# Patient Record
Sex: FEMALE | Race: ASIAN
[De-identification: names, ages, dates, MRNs, and addresses within clinical notes are randomized per-mention and may not be internally consistent; named-entity substitution may affect disease eponyms.]

---

## 2017-03-29 ENCOUNTER — HOSPITAL ENCOUNTER (OUTPATIENT)
Dept: HOSPITAL 8 - CACL | Age: 62
Discharge: HOME | End: 2017-03-29
Attending: INTERNAL MEDICINE
Payer: COMMERCIAL

## 2017-03-29 VITALS — SYSTOLIC BLOOD PRESSURE: 151 MMHG | DIASTOLIC BLOOD PRESSURE: 91 MMHG

## 2017-03-29 VITALS — HEIGHT: 65 IN | BODY MASS INDEX: 31.55 KG/M2 | WEIGHT: 189.38 LBS

## 2017-03-29 DIAGNOSIS — E03.9: ICD-10-CM

## 2017-03-29 DIAGNOSIS — I34.0: ICD-10-CM

## 2017-03-29 DIAGNOSIS — E78.5: ICD-10-CM

## 2017-03-29 DIAGNOSIS — I25.10: ICD-10-CM

## 2017-03-29 DIAGNOSIS — I48.91: Primary | ICD-10-CM

## 2017-03-29 DIAGNOSIS — Z95.5: ICD-10-CM

## 2017-03-29 DIAGNOSIS — I10: ICD-10-CM

## 2017-03-29 LAB
BUN SERPL-MCNC: 32 MG/DL (ref 7–18)
HGB BLD-MCNC: 13.1 G/DL (ref 11.7–16.4)

## 2017-03-29 PROCEDURE — 85610 PROTHROMBIN TIME: CPT

## 2017-03-29 PROCEDURE — 80048 BASIC METABOLIC PNL TOTAL CA: CPT

## 2017-03-29 PROCEDURE — 92960 CARDIOVERSION ELECTRIC EXT: CPT

## 2017-03-29 PROCEDURE — 36415 COLL VENOUS BLD VENIPUNCTURE: CPT

## 2017-03-29 PROCEDURE — 85025 COMPLETE CBC W/AUTO DIFF WBC: CPT

## 2017-04-18 ENCOUNTER — HOSPITAL ENCOUNTER (OUTPATIENT)
Dept: HOSPITAL 8 - CFH | Age: 62
Discharge: HOME | End: 2017-04-18
Attending: UROLOGY
Payer: COMMERCIAL

## 2017-04-18 DIAGNOSIS — Z95.2: ICD-10-CM

## 2017-04-18 DIAGNOSIS — K57.30: Primary | ICD-10-CM

## 2017-04-18 DIAGNOSIS — K76.0: ICD-10-CM

## 2017-04-18 PROCEDURE — 74178 CT ABD&PLV WO CNTR FLWD CNTR: CPT

## 2017-11-16 ENCOUNTER — HOSPITAL ENCOUNTER (OUTPATIENT)
Dept: HOSPITAL 8 - CFH | Age: 62
Discharge: HOME | End: 2017-11-16
Attending: FAMILY MEDICINE
Payer: COMMERCIAL

## 2017-11-16 DIAGNOSIS — Z12.31: Primary | ICD-10-CM

## 2017-11-16 PROCEDURE — G0202 SCR MAMMO BI INCL CAD: HCPCS

## 2018-04-27 VITALS — SYSTOLIC BLOOD PRESSURE: 153 MMHG | DIASTOLIC BLOOD PRESSURE: 104 MMHG

## 2018-04-27 LAB
ANION GAP SERPL CALC-SCNC: 8 MMOL/L (ref 5–15)
BASOPHILS # BLD AUTO: 0.05 X10^3/UL (ref 0–0.1)
BASOPHILS NFR BLD AUTO: 1 % (ref 0–1)
CALCIUM SERPL-MCNC: 9.2 MG/DL (ref 8.5–10.1)
CHLORIDE SERPL-SCNC: 109 MMOL/L (ref 98–107)
CREAT SERPL-MCNC: 1.04 MG/DL (ref 0.55–1.02)
EOSINOPHIL # BLD AUTO: 0.1 X10^3/UL (ref 0–0.4)
EOSINOPHIL NFR BLD AUTO: 2 % (ref 1–7)
ERYTHROCYTE [DISTWIDTH] IN BLOOD BY AUTOMATED COUNT: 14.2 % (ref 9.6–15.2)
LYMPHOCYTES # BLD AUTO: 2.41 X10^3/UL (ref 1–3.4)
LYMPHOCYTES NFR BLD AUTO: 38 % (ref 22–44)
MCH RBC QN AUTO: 28.9 PG (ref 27–34.8)
MCHC RBC AUTO-ENTMCNC: 33.4 G/DL (ref 32.4–35.8)
MCV RBC AUTO: 86.7 FL (ref 80–100)
MD: NO
MONOCYTES # BLD AUTO: 0.51 X10^3/UL (ref 0.2–0.8)
MONOCYTES NFR BLD AUTO: 8 % (ref 2–9)
NEUTROPHILS # BLD AUTO: 3.23 X10^3/UL (ref 1.8–6.8)
NEUTROPHILS NFR BLD AUTO: 51 % (ref 42–75)
PLATELET # BLD AUTO: 198 X10^3/UL (ref 130–400)
PMV BLD AUTO: 9.1 FL (ref 7.4–10.4)
RBC # BLD AUTO: 5.51 X10^6/UL (ref 3.82–5.3)

## 2018-04-30 ENCOUNTER — HOSPITAL ENCOUNTER (INPATIENT)
Dept: HOSPITAL 8 - CACL | Age: 63
LOS: 3 days | Discharge: HOME | DRG: 243 | End: 2018-05-03
Attending: INTERNAL MEDICINE | Admitting: INTERNAL MEDICINE
Payer: MEDICAID

## 2018-04-30 VITALS — DIASTOLIC BLOOD PRESSURE: 71 MMHG | SYSTOLIC BLOOD PRESSURE: 122 MMHG

## 2018-04-30 VITALS — HEIGHT: 65 IN | WEIGHT: 202.83 LBS | BODY MASS INDEX: 33.79 KG/M2

## 2018-04-30 VITALS — DIASTOLIC BLOOD PRESSURE: 62 MMHG | SYSTOLIC BLOOD PRESSURE: 108 MMHG

## 2018-04-30 DIAGNOSIS — E03.9: ICD-10-CM

## 2018-04-30 DIAGNOSIS — I48.92: Primary | ICD-10-CM

## 2018-04-30 DIAGNOSIS — D68.69: ICD-10-CM

## 2018-04-30 DIAGNOSIS — E11.9: ICD-10-CM

## 2018-04-30 DIAGNOSIS — Z87.891: ICD-10-CM

## 2018-04-30 DIAGNOSIS — I10: ICD-10-CM

## 2018-04-30 DIAGNOSIS — E66.9: ICD-10-CM

## 2018-04-30 DIAGNOSIS — Z95.2: ICD-10-CM

## 2018-04-30 DIAGNOSIS — E78.5: ICD-10-CM

## 2018-04-30 PROCEDURE — 85025 COMPLETE CBC W/AUTO DIFF WBC: CPT

## 2018-04-30 PROCEDURE — 93613 INTRACARDIAC EPHYS 3D MAPG: CPT

## 2018-04-30 PROCEDURE — 02K83ZZ MAP CONDUCTION MECHANISM, PERCUTANEOUS APPROACH: ICD-10-PCS | Performed by: INTERNAL MEDICINE

## 2018-04-30 PROCEDURE — C2630 CATH, EP, COOL-TIP: HCPCS

## 2018-04-30 PROCEDURE — 36415 COLL VENOUS BLD VENIPUNCTURE: CPT

## 2018-04-30 PROCEDURE — C1893 INTRO/SHEATH, FIXED,NON-PEEL: HCPCS

## 2018-04-30 PROCEDURE — C1759 CATH, INTRA ECHOCARDIOGRAPHY: HCPCS

## 2018-04-30 PROCEDURE — C1731 CATH, EP, 20 OR MORE ELEC: HCPCS

## 2018-04-30 PROCEDURE — C1785 PMKR, DUAL, RATE-RESP: HCPCS

## 2018-04-30 PROCEDURE — 93621 COMP EP EVL L PAC&REC C SINS: CPT

## 2018-04-30 PROCEDURE — G0378 HOSPITAL OBSERVATION PER HR: HCPCS

## 2018-04-30 PROCEDURE — 71045 X-RAY EXAM CHEST 1 VIEW: CPT

## 2018-04-30 PROCEDURE — 93462 L HRT CATH TRNSPTL PUNCTURE: CPT

## 2018-04-30 PROCEDURE — 02583ZZ DESTRUCTION OF CONDUCTION MECHANISM, PERCUTANEOUS APPROACH: ICD-10-PCS | Performed by: INTERNAL MEDICINE

## 2018-04-30 PROCEDURE — 33208 INSRT HEART PM ATRIAL & VENT: CPT

## 2018-04-30 PROCEDURE — 85347 COAGULATION TIME ACTIVATED: CPT

## 2018-04-30 PROCEDURE — 93312 ECHO TRANSESOPHAGEAL: CPT

## 2018-04-30 PROCEDURE — 71046 X-RAY EXAM CHEST 2 VIEWS: CPT

## 2018-04-30 PROCEDURE — C1779 LEAD, PMKR, TRANSVENOUS VDD: HCPCS

## 2018-04-30 PROCEDURE — 80048 BASIC METABOLIC PNL TOTAL CA: CPT

## 2018-04-30 PROCEDURE — C1730 CATH, EP, 19 OR FEW ELECT: HCPCS

## 2018-04-30 PROCEDURE — C1766 INTRO/SHEATH,STRBLE,NON-PEEL: HCPCS

## 2018-04-30 PROCEDURE — C1732 CATH, EP, DIAG/ABL, 3D/VECT: HCPCS

## 2018-04-30 PROCEDURE — C1894 INTRO/SHEATH, NON-LASER: HCPCS

## 2018-04-30 PROCEDURE — 85520 HEPARIN ASSAY: CPT

## 2018-04-30 PROCEDURE — 93005 ELECTROCARDIOGRAM TRACING: CPT

## 2018-04-30 PROCEDURE — C1892 INTRO/SHEATH,FIXED,PEEL-AWAY: HCPCS

## 2018-04-30 PROCEDURE — 93306 TTE W/DOPPLER COMPLETE: CPT

## 2018-04-30 PROCEDURE — 93662 INTRACARDIAC ECG (ICE): CPT

## 2018-04-30 PROCEDURE — 93653 COMPRE EP EVAL TX SVT: CPT

## 2018-04-30 PROCEDURE — 93325 DOPPLER ECHO COLOR FLOW MAPG: CPT

## 2018-04-30 RX ADMIN — ATORVASTATIN CALCIUM SCH MG: 10 TABLET, FILM COATED ORAL at 20:05

## 2018-04-30 RX ADMIN — SOTALOL HYDROCHLORIDE SCH MG: 80 TABLET ORAL at 18:43

## 2018-05-01 VITALS — SYSTOLIC BLOOD PRESSURE: 116 MMHG | DIASTOLIC BLOOD PRESSURE: 73 MMHG

## 2018-05-01 VITALS — DIASTOLIC BLOOD PRESSURE: 75 MMHG | SYSTOLIC BLOOD PRESSURE: 118 MMHG

## 2018-05-01 VITALS — DIASTOLIC BLOOD PRESSURE: 69 MMHG | SYSTOLIC BLOOD PRESSURE: 106 MMHG

## 2018-05-01 VITALS — SYSTOLIC BLOOD PRESSURE: 122 MMHG | DIASTOLIC BLOOD PRESSURE: 83 MMHG

## 2018-05-01 PROCEDURE — 02HK3JZ INSERTION OF PACEMAKER LEAD INTO RIGHT VENTRICLE, PERCUTANEOUS APPROACH: ICD-10-PCS | Performed by: INTERNAL MEDICINE

## 2018-05-01 PROCEDURE — 4A023FZ MEASUREMENT OF CARDIAC RHYTHM, PERCUTANEOUS APPROACH: ICD-10-PCS | Performed by: INTERNAL MEDICINE

## 2018-05-01 PROCEDURE — 0JH606Z INSERTION OF PACEMAKER, DUAL CHAMBER INTO CHEST SUBCUTANEOUS TISSUE AND FASCIA, OPEN APPROACH: ICD-10-PCS | Performed by: INTERNAL MEDICINE

## 2018-05-01 PROCEDURE — 02H63JZ INSERTION OF PACEMAKER LEAD INTO RIGHT ATRIUM, PERCUTANEOUS APPROACH: ICD-10-PCS | Performed by: INTERNAL MEDICINE

## 2018-05-01 PROCEDURE — 4A0234Z MEASUREMENT OF CARDIAC ELECTRICAL ACTIVITY, PERCUTANEOUS APPROACH: ICD-10-PCS | Performed by: INTERNAL MEDICINE

## 2018-05-01 PROCEDURE — B246ZZ4 ULTRASONOGRAPHY OF RIGHT AND LEFT HEART, TRANSESOPHAGEAL: ICD-10-PCS | Performed by: INTERNAL MEDICINE

## 2018-05-01 RX ADMIN — APIXABAN SCH MG: 5 TABLET, FILM COATED ORAL at 19:50

## 2018-05-01 RX ADMIN — SOTALOL HYDROCHLORIDE SCH MG: 80 TABLET ORAL at 17:38

## 2018-05-01 RX ADMIN — PIOGLITAZONE HYDROCHLORIDE SCH MG: 15 TABLET ORAL at 09:01

## 2018-05-01 RX ADMIN — FENOFIBRATE SCH MG: 145 TABLET, FILM COATED ORAL at 09:01

## 2018-05-01 RX ADMIN — LOSARTAN POTASSIUM SCH MG: 25 TABLET, FILM COATED ORAL at 09:01

## 2018-05-01 RX ADMIN — ATORVASTATIN CALCIUM SCH MG: 10 TABLET, FILM COATED ORAL at 19:50

## 2018-05-01 RX ADMIN — SOTALOL HYDROCHLORIDE SCH MG: 80 TABLET ORAL at 05:54

## 2018-05-01 RX ADMIN — APIXABAN SCH MG: 5 TABLET, FILM COATED ORAL at 09:01

## 2018-05-01 RX ADMIN — Medication SCH MG: at 09:01

## 2018-05-02 VITALS — DIASTOLIC BLOOD PRESSURE: 78 MMHG | SYSTOLIC BLOOD PRESSURE: 117 MMHG

## 2018-05-02 VITALS — SYSTOLIC BLOOD PRESSURE: 106 MMHG | DIASTOLIC BLOOD PRESSURE: 72 MMHG

## 2018-05-02 VITALS — SYSTOLIC BLOOD PRESSURE: 119 MMHG | DIASTOLIC BLOOD PRESSURE: 76 MMHG

## 2018-05-02 VITALS — SYSTOLIC BLOOD PRESSURE: 133 MMHG | DIASTOLIC BLOOD PRESSURE: 86 MMHG

## 2018-05-02 RX ADMIN — SOTALOL HYDROCHLORIDE SCH MG: 80 TABLET ORAL at 06:16

## 2018-05-02 RX ADMIN — APIXABAN SCH MG: 5 TABLET, FILM COATED ORAL at 19:52

## 2018-05-02 RX ADMIN — ATORVASTATIN CALCIUM SCH MG: 10 TABLET, FILM COATED ORAL at 19:52

## 2018-05-02 RX ADMIN — APIXABAN SCH MG: 5 TABLET, FILM COATED ORAL at 09:58

## 2018-05-02 RX ADMIN — FENOFIBRATE SCH MG: 145 TABLET, FILM COATED ORAL at 09:57

## 2018-05-02 RX ADMIN — LOSARTAN POTASSIUM SCH MG: 25 TABLET, FILM COATED ORAL at 09:58

## 2018-05-02 RX ADMIN — Medication SCH MG: at 09:57

## 2018-05-02 RX ADMIN — SOTALOL HYDROCHLORIDE SCH MG: 80 TABLET ORAL at 18:22

## 2018-05-02 RX ADMIN — PIOGLITAZONE HYDROCHLORIDE SCH MG: 15 TABLET ORAL at 09:57

## 2018-05-02 RX ADMIN — LEVOTHYROXINE SODIUM SCH MCG: 75 TABLET ORAL at 09:58

## 2018-05-03 VITALS — DIASTOLIC BLOOD PRESSURE: 88 MMHG | SYSTOLIC BLOOD PRESSURE: 139 MMHG

## 2018-05-03 VITALS — SYSTOLIC BLOOD PRESSURE: 143 MMHG | DIASTOLIC BLOOD PRESSURE: 84 MMHG

## 2018-05-03 VITALS — SYSTOLIC BLOOD PRESSURE: 157 MMHG | DIASTOLIC BLOOD PRESSURE: 92 MMHG

## 2018-05-03 VITALS — DIASTOLIC BLOOD PRESSURE: 86 MMHG | SYSTOLIC BLOOD PRESSURE: 142 MMHG

## 2018-05-03 RX ADMIN — LEVOTHYROXINE SODIUM SCH MCG: 75 TABLET ORAL at 05:43

## 2018-05-03 RX ADMIN — Medication SCH MG: at 08:22

## 2018-05-03 RX ADMIN — PIOGLITAZONE HYDROCHLORIDE SCH MG: 15 TABLET ORAL at 08:22

## 2018-05-03 RX ADMIN — CEPHALEXIN SCH MG: 500 CAPSULE ORAL at 13:54

## 2018-05-03 RX ADMIN — SOTALOL HYDROCHLORIDE SCH MG: 80 TABLET ORAL at 05:43

## 2018-05-03 RX ADMIN — LOSARTAN POTASSIUM SCH MG: 25 TABLET, FILM COATED ORAL at 08:23

## 2018-05-03 RX ADMIN — SOTALOL HYDROCHLORIDE SCH MG: 80 TABLET ORAL at 16:33

## 2018-05-03 RX ADMIN — APIXABAN SCH MG: 5 TABLET, FILM COATED ORAL at 08:22

## 2018-05-03 RX ADMIN — FENOFIBRATE SCH MG: 145 TABLET, FILM COATED ORAL at 08:22

## 2018-05-03 RX ADMIN — CEPHALEXIN SCH MG: 500 CAPSULE ORAL at 16:32

## 2018-09-28 ENCOUNTER — HOSPITAL ENCOUNTER (OUTPATIENT)
Dept: HOSPITAL 8 - CACL | Age: 63
Discharge: HOME | End: 2018-09-28
Attending: INTERNAL MEDICINE
Payer: COMMERCIAL

## 2018-09-28 VITALS — WEIGHT: 220.46 LBS | HEIGHT: 65 IN | BODY MASS INDEX: 36.73 KG/M2

## 2018-09-28 VITALS — DIASTOLIC BLOOD PRESSURE: 99 MMHG | SYSTOLIC BLOOD PRESSURE: 140 MMHG

## 2018-09-28 DIAGNOSIS — I25.119: ICD-10-CM

## 2018-09-28 DIAGNOSIS — E78.5: ICD-10-CM

## 2018-09-28 DIAGNOSIS — I48.92: ICD-10-CM

## 2018-09-28 DIAGNOSIS — I48.91: Primary | ICD-10-CM

## 2018-09-28 DIAGNOSIS — Z95.2: ICD-10-CM

## 2018-09-28 DIAGNOSIS — I10: ICD-10-CM

## 2018-09-28 DIAGNOSIS — E03.9: ICD-10-CM

## 2018-09-28 DIAGNOSIS — Z98.61: ICD-10-CM

## 2018-09-28 LAB
ANION GAP SERPL CALC-SCNC: 9 MMOL/L (ref 5–15)
BASOPHILS # BLD AUTO: 0.05 X10^3/UL (ref 0–0.1)
BASOPHILS NFR BLD AUTO: 1 % (ref 0–1)
CALCIUM SERPL-MCNC: 9.3 MG/DL (ref 8.5–10.1)
CHLORIDE SERPL-SCNC: 110 MMOL/L (ref 98–107)
CREAT SERPL-MCNC: 1.03 MG/DL (ref 0.55–1.02)
EOSINOPHIL # BLD AUTO: 0.08 X10^3/UL (ref 0–0.4)
EOSINOPHIL NFR BLD AUTO: 1 % (ref 1–7)
ERYTHROCYTE [DISTWIDTH] IN BLOOD BY AUTOMATED COUNT: 14.6 % (ref 9.6–15.2)
LYMPHOCYTES # BLD AUTO: 2.21 X10^3/UL (ref 1–3.4)
LYMPHOCYTES NFR BLD AUTO: 35 % (ref 22–44)
MCH RBC QN AUTO: 29.9 PG (ref 27–34.8)
MCHC RBC AUTO-ENTMCNC: 33.6 G/DL (ref 32.4–35.8)
MCV RBC AUTO: 89 FL (ref 80–100)
MD: NO
MONOCYTES # BLD AUTO: 0.49 X10^3/UL (ref 0.2–0.8)
MONOCYTES NFR BLD AUTO: 8 % (ref 2–9)
NEUTROPHILS # BLD AUTO: 3.41 X10^3/UL (ref 1.8–6.8)
NEUTROPHILS NFR BLD AUTO: 55 % (ref 42–75)
PLATELET # BLD AUTO: 161 X10^3/UL (ref 130–400)
PMV BLD AUTO: 9.5 FL (ref 7.4–10.4)
RBC # BLD AUTO: 5.55 X10^6/UL (ref 3.82–5.3)

## 2018-09-28 PROCEDURE — 80048 BASIC METABOLIC PNL TOTAL CA: CPT

## 2018-09-28 PROCEDURE — 36415 COLL VENOUS BLD VENIPUNCTURE: CPT

## 2018-09-28 PROCEDURE — 85025 COMPLETE CBC W/AUTO DIFF WBC: CPT

## 2018-09-28 PROCEDURE — 93005 ELECTROCARDIOGRAM TRACING: CPT

## 2018-09-28 PROCEDURE — 92960 CARDIOVERSION ELECTRIC EXT: CPT

## 2019-03-12 ENCOUNTER — HOSPITAL ENCOUNTER (OUTPATIENT)
Dept: HOSPITAL 8 - CFH | Age: 64
Discharge: HOME | End: 2019-03-12
Attending: PHYSICIAN ASSISTANT
Payer: MEDICARE

## 2019-03-12 DIAGNOSIS — Z95.0: ICD-10-CM

## 2019-03-12 DIAGNOSIS — K57.30: Primary | ICD-10-CM

## 2019-03-12 DIAGNOSIS — I51.7: ICD-10-CM

## 2019-03-12 PROCEDURE — 74178 CT ABD&PLV WO CNTR FLWD CNTR: CPT

## 2019-07-08 ENCOUNTER — HOSPITAL ENCOUNTER (OUTPATIENT)
Dept: HOSPITAL 8 - CFH | Age: 64
Discharge: HOME | End: 2019-07-08
Attending: PHYSICIAN ASSISTANT
Payer: MEDICARE

## 2019-07-08 DIAGNOSIS — I35.8: Primary | ICD-10-CM

## 2019-07-08 DIAGNOSIS — I25.9: ICD-10-CM

## 2019-07-08 PROCEDURE — 93306 TTE W/DOPPLER COMPLETE: CPT

## 2021-03-03 DIAGNOSIS — Z23 NEED FOR VACCINATION: ICD-10-CM

## 2021-04-09 ENCOUNTER — HOSPITAL ENCOUNTER (EMERGENCY)
Dept: HOSPITAL 8 - ED | Age: 66
Discharge: HOME | End: 2021-04-09
Payer: MEDICARE

## 2021-04-09 VITALS — BODY MASS INDEX: 37.85 KG/M2 | WEIGHT: 213.63 LBS | HEIGHT: 63 IN

## 2021-04-09 DIAGNOSIS — R04.0: Primary | ICD-10-CM

## 2021-04-09 PROCEDURE — 99281 EMR DPT VST MAYX REQ PHY/QHP: CPT

## 2021-04-09 NOTE — NUR
PT. IS A & O X 4 WITH A GCS OF 15.  PT. WAS SENT FROM URGENT CARE FOR C/O 
EPISTAXIS.  PT. IS CURRENTLY WITHOUT A BLOODY NOSE.  PT. DENIES C/O PAIN.  S1 
S2 NOTED WITH A MURMUR NOTED.  CAP REFILL IS BRISK, LESS THAN 2 SECONDS.  SKIN 
IS PINK, WARM AND DRY.  PT.'S LUNGS ARE CTA THROUGHOUT.  PT.'S NECK IS MIDLINE 
WITHOUT JVD NOTED.  ABD. IS SOFT AND NON-TENDER WITH BS + X 4 QUADS.  PT. IS 
AMBULATORY TO THE CHAIR AND WAITING TO BE SEEN.  CALL LIGHT IS IN PLACE.

## 2021-04-09 NOTE — NUR
CARE FOR DC ONLY PROVIDED.  NO NOSE BLEEDING NOTED.  NO IV TO DC.  REVIEWED DC 
INSTRUCTIONS WITH PT.  UNDERSTANDING VERBALZIED.  PT LEFT AMB, GAIT STEADY.

## 2021-06-08 ENCOUNTER — HOSPITAL ENCOUNTER (OUTPATIENT)
Dept: HOSPITAL 8 - CVU | Age: 66
Discharge: HOME | End: 2021-06-08
Attending: INTERNAL MEDICINE
Payer: MEDICARE

## 2021-06-08 DIAGNOSIS — M79.89: ICD-10-CM

## 2021-06-08 DIAGNOSIS — Z95.2: ICD-10-CM

## 2021-06-08 DIAGNOSIS — I08.2: Primary | ICD-10-CM

## 2021-06-08 PROCEDURE — 93970 EXTREMITY STUDY: CPT

## 2021-06-08 PROCEDURE — 93306 TTE W/DOPPLER COMPLETE: CPT

## 2022-10-27 ENCOUNTER — APPOINTMENT (OUTPATIENT)
Dept: RADIOLOGY | Facility: MEDICAL CENTER | Age: 67
DRG: 023 | End: 2022-10-27
Attending: INTERNAL MEDICINE
Payer: MEDICARE

## 2022-10-27 ENCOUNTER — APPOINTMENT (OUTPATIENT)
Dept: RADIOLOGY | Facility: MEDICAL CENTER | Age: 67
DRG: 023 | End: 2022-10-27
Attending: EMERGENCY MEDICINE
Payer: MEDICARE

## 2022-10-27 ENCOUNTER — ANESTHESIA (OUTPATIENT)
Dept: SURGERY | Facility: MEDICAL CENTER | Age: 67
DRG: 023 | End: 2022-10-27
Payer: MEDICARE

## 2022-10-27 ENCOUNTER — HOSPITAL ENCOUNTER (INPATIENT)
Facility: MEDICAL CENTER | Age: 67
LOS: 1 days | DRG: 023 | End: 2022-10-28
Attending: EMERGENCY MEDICINE | Admitting: INTERNAL MEDICINE
Payer: MEDICARE

## 2022-10-27 ENCOUNTER — HOSPITAL ENCOUNTER (EMERGENCY)
Facility: MEDICAL CENTER | Age: 67
DRG: 023 | End: 2022-10-27
Attending: EMERGENCY MEDICINE
Payer: MEDICARE

## 2022-10-27 ENCOUNTER — APPOINTMENT (OUTPATIENT)
Dept: RADIOLOGY | Facility: MEDICAL CENTER | Age: 67
DRG: 023 | End: 2022-10-27
Attending: PSYCHIATRY & NEUROLOGY
Payer: MEDICARE

## 2022-10-27 ENCOUNTER — ANESTHESIA EVENT (OUTPATIENT)
Dept: SURGERY | Facility: MEDICAL CENTER | Age: 67
DRG: 023 | End: 2022-10-27
Payer: MEDICARE

## 2022-10-27 ENCOUNTER — APPOINTMENT (OUTPATIENT)
Dept: RADIOLOGY | Facility: MEDICAL CENTER | Age: 67
DRG: 023 | End: 2022-10-27
Attending: NURSE PRACTITIONER
Payer: MEDICARE

## 2022-10-27 ENCOUNTER — APPOINTMENT (OUTPATIENT)
Dept: RADIOLOGY | Facility: MEDICAL CENTER | Age: 67
DRG: 023 | End: 2022-10-27
Attending: RADIOLOGY
Payer: MEDICARE

## 2022-10-27 DIAGNOSIS — I63.89 CEREBROVASCULAR ACCIDENT (CVA) DUE TO OTHER MECHANISM (HCC): ICD-10-CM

## 2022-10-27 DIAGNOSIS — I63.9 ACUTE ISCHEMIC STROKE (HCC): ICD-10-CM

## 2022-10-27 PROBLEM — I25.10 CAD (CORONARY ARTERY DISEASE): Status: ACTIVE | Noted: 2022-10-27

## 2022-10-27 PROBLEM — I48.91 AF (ATRIAL FIBRILLATION) (HCC): Status: ACTIVE | Noted: 2022-10-27

## 2022-10-27 PROBLEM — I10 PRIMARY HYPERTENSION: Status: ACTIVE | Noted: 2022-10-27

## 2022-10-27 PROBLEM — E03.9 HYPOTHYROIDISM: Status: ACTIVE | Noted: 2022-10-27

## 2022-10-27 PROBLEM — Z95.2 S/P MVR (MITRAL VALVE REPLACEMENT): Status: ACTIVE | Noted: 2022-10-27

## 2022-10-27 PROBLEM — E78.5 DYSLIPIDEMIA: Status: ACTIVE | Noted: 2022-10-27

## 2022-10-27 LAB
ABO + RH BLD: NORMAL
ABO GROUP BLD: NORMAL
ABO GROUP BLD: NORMAL
ALBUMIN SERPL BCP-MCNC: 2.4 G/DL (ref 3.2–4.9)
ALBUMIN SERPL BCP-MCNC: NORMAL G/DL (ref 3.2–4.9)
ALBUMIN/GLOB SERPL: 0.5 G/DL
ALBUMIN/GLOB SERPL: NORMAL G/DL
ALP SERPL-CCNC: 87 U/L (ref 30–99)
ALP SERPL-CCNC: NORMAL U/L (ref 30–99)
ALT SERPL-CCNC: 37 U/L (ref 2–50)
ALT SERPL-CCNC: NORMAL U/L (ref 2–50)
ANION GAP SERPL CALC-SCNC: 11 MMOL/L (ref 7–16)
ANION GAP SERPL CALC-SCNC: 9 MMOL/L (ref 7–16)
ANION GAP SERPL CALC-SCNC: NORMAL MMOL/L (ref 7–16)
APTT PPP: 41.1 SEC (ref 24.7–36)
APTT PPP: NORMAL SEC (ref 24.7–36)
AST SERPL-CCNC: 48 U/L (ref 12–45)
AST SERPL-CCNC: NORMAL U/L (ref 12–45)
BARCODED ABORH UBTYP: 5100
BARCODED PRD CODE UBPRD: NORMAL
BARCODED UNIT NUM UBUNT: NORMAL
BASE EXCESS BLDA CALC-SCNC: -6 MMOL/L (ref -4–3)
BASOPHILS # BLD AUTO: 0.3 % (ref 0–1.8)
BASOPHILS # BLD AUTO: NORMAL % (ref 0–1.8)
BASOPHILS # BLD: 0.04 K/UL (ref 0–0.12)
BASOPHILS # BLD: NORMAL K/UL (ref 0–0.12)
BILIRUB SERPL-MCNC: 1.1 MG/DL (ref 0.1–1.5)
BILIRUB SERPL-MCNC: NORMAL MG/DL (ref 0.1–1.5)
BLD GP AB SCN SERPL QL: NORMAL
BLD GP AB SCN SERPL QL: NORMAL
BODY TEMPERATURE: ABNORMAL DEGREES
BUN SERPL-MCNC: 38 MG/DL (ref 8–22)
BUN SERPL-MCNC: 39 MG/DL (ref 8–22)
BUN SERPL-MCNC: NORMAL MG/DL (ref 8–22)
CALCIUM SERPL-MCNC: 8.3 MG/DL (ref 8.5–10.5)
CALCIUM SERPL-MCNC: 8.7 MG/DL (ref 8.5–10.5)
CALCIUM SERPL-MCNC: NORMAL MG/DL (ref 8.5–10.5)
CHLORIDE SERPL-SCNC: 105 MMOL/L (ref 96–112)
CHLORIDE SERPL-SCNC: 105 MMOL/L (ref 96–112)
CHLORIDE SERPL-SCNC: NORMAL MMOL/L (ref 96–112)
CO2 BLDA-SCNC: 22 MMOL/L (ref 20–33)
CO2 SERPL-SCNC: 17 MMOL/L (ref 20–33)
CO2 SERPL-SCNC: 18 MMOL/L (ref 20–33)
CO2 SERPL-SCNC: NORMAL MMOL/L (ref 20–33)
COMPONENT P 8504P: NORMAL
CREAT SERPL-MCNC: 1.89 MG/DL (ref 0.5–1.4)
CREAT SERPL-MCNC: 2.04 MG/DL (ref 0.5–1.4)
CREAT SERPL-MCNC: NORMAL MG/DL (ref 0.5–1.4)
EKG IMPRESSION: NORMAL
EKG IMPRESSION: NORMAL
EOSINOPHIL # BLD AUTO: 0.04 K/UL (ref 0–0.51)
EOSINOPHIL # BLD AUTO: NORMAL K/UL (ref 0–0.51)
EOSINOPHIL NFR BLD: 0.3 % (ref 0–6.9)
EOSINOPHIL NFR BLD: NORMAL % (ref 0–6.9)
ERYTHROCYTE [DISTWIDTH] IN BLOOD BY AUTOMATED COUNT: 48.6 FL (ref 35.9–50)
ERYTHROCYTE [DISTWIDTH] IN BLOOD BY AUTOMATED COUNT: NORMAL FL (ref 35.9–50)
GFR SERPLBLD CREATININE-BSD FMLA CKD-EPI: 26 ML/MIN/1.73 M 2
GFR SERPLBLD CREATININE-BSD FMLA CKD-EPI: 28 ML/MIN/1.73 M 2
GFR SERPLBLD CREATININE-BSD FMLA CKD-EPI: NORMAL ML/MIN/1.73 M 2
GLOBULIN SER CALC-MCNC: 4.6 G/DL (ref 1.9–3.5)
GLOBULIN SER CALC-MCNC: NORMAL G/DL (ref 1.9–3.5)
GLUCOSE SERPL-MCNC: 182 MG/DL (ref 65–99)
GLUCOSE SERPL-MCNC: 99 MG/DL (ref 65–99)
GLUCOSE SERPL-MCNC: NORMAL MG/DL (ref 65–99)
HCO3 BLDA-SCNC: 20.9 MMOL/L (ref 17–25)
HCT VFR BLD AUTO: 32.9 % (ref 37–47)
HCT VFR BLD AUTO: NORMAL % (ref 37–47)
HGB BLD-MCNC: 10.7 G/DL (ref 12–16)
HGB BLD-MCNC: NORMAL G/DL (ref 12–16)
HOROWITZ INDEX BLDA+IHG-RTO: 234 MM[HG]
IMM GRANULOCYTES # BLD AUTO: 0.2 K/UL (ref 0–0.11)
IMM GRANULOCYTES # BLD AUTO: NORMAL K/UL (ref 0–0.11)
IMM GRANULOCYTES NFR BLD AUTO: 1.4 % (ref 0–0.9)
IMM GRANULOCYTES NFR BLD AUTO: NORMAL % (ref 0–0.9)
INR PPP: 2.01 (ref 0.87–1.13)
INR PPP: NORMAL (ref 0.87–1.13)
LACTATE BLD-SCNC: 0.7 MMOL/L (ref 0.5–2)
LYMPHOCYTES # BLD AUTO: 1.01 K/UL (ref 1–4.8)
LYMPHOCYTES # BLD AUTO: NORMAL K/UL (ref 1–4.8)
LYMPHOCYTES NFR BLD: 7.1 % (ref 22–41)
LYMPHOCYTES NFR BLD: NORMAL % (ref 22–41)
MCH RBC QN AUTO: 30 PG (ref 27–33)
MCH RBC QN AUTO: NORMAL PG (ref 27–33)
MCHC RBC AUTO-ENTMCNC: 32.5 G/DL (ref 33.6–35)
MCHC RBC AUTO-ENTMCNC: NORMAL G/DL (ref 33.6–35)
MCV RBC AUTO: 92.2 FL (ref 81.4–97.8)
MCV RBC AUTO: NORMAL FL (ref 81.4–97.8)
MONOCYTES # BLD AUTO: 1.15 K/UL (ref 0–0.85)
MONOCYTES # BLD AUTO: NORMAL K/UL (ref 0–0.85)
MONOCYTES NFR BLD AUTO: 8.1 % (ref 0–13.4)
MONOCYTES NFR BLD AUTO: NORMAL % (ref 0–13.4)
NEUTROPHILS # BLD AUTO: 11.73 K/UL (ref 2–7.15)
NEUTROPHILS # BLD AUTO: NORMAL K/UL (ref 2–7.15)
NEUTROPHILS NFR BLD: 82.8 % (ref 44–72)
NEUTROPHILS NFR BLD: NORMAL % (ref 44–72)
NRBC # BLD AUTO: 0 K/UL
NRBC # BLD AUTO: NORMAL K/UL
NRBC BLD-RTO: 0 /100 WBC
NRBC BLD-RTO: NORMAL /100 WBC
O2/TOTAL GAS SETTING VFR VENT: 100 %
PCO2 BLDA: 46.7 MMHG (ref 26–37)
PCO2 TEMP ADJ BLDA: 45.9 MMHG (ref 26–37)
PH BLDA: 7.26 [PH] (ref 7.4–7.5)
PH TEMP ADJ BLDA: 7.26 [PH] (ref 7.4–7.5)
PLATELET # BLD AUTO: 82 K/UL (ref 164–446)
PLATELET # BLD AUTO: NORMAL K/UL (ref 164–446)
PMV BLD AUTO: 11.2 FL (ref 9–12.9)
PMV BLD AUTO: NORMAL FL (ref 9–12.9)
PO2 BLDA: 234 MMHG (ref 64–87)
PO2 TEMP ADJ BLDA: 232 MMHG (ref 64–87)
POTASSIUM SERPL-SCNC: 4.6 MMOL/L (ref 3.6–5.5)
POTASSIUM SERPL-SCNC: 5.1 MMOL/L (ref 3.6–5.5)
POTASSIUM SERPL-SCNC: NORMAL MMOL/L (ref 3.6–5.5)
PRODUCT TYPE UPROD: NORMAL
PROT SERPL-MCNC: 7 G/DL (ref 6–8.2)
PROT SERPL-MCNC: NORMAL G/DL (ref 6–8.2)
PROTHROMBIN TIME: 22.3 SEC (ref 12–14.6)
PROTHROMBIN TIME: NORMAL SEC (ref 12–14.6)
RBC # BLD AUTO: 3.57 M/UL (ref 4.2–5.4)
RBC # BLD AUTO: NORMAL M/UL (ref 4.2–5.4)
RH BLD: NORMAL
RH BLD: NORMAL
SAO2 % BLDA: 100 % (ref 93–99)
SODIUM SERPL-SCNC: 131 MMOL/L (ref 135–145)
SODIUM SERPL-SCNC: 134 MMOL/L (ref 135–145)
SODIUM SERPL-SCNC: NORMAL MMOL/L (ref 135–145)
SPECIMEN DRAWN FROM PATIENT: ABNORMAL
TROPONIN T SERPL-MCNC: 29 NG/L (ref 6–19)
TROPONIN T SERPL-MCNC: NORMAL NG/L (ref 6–19)
UNIT STATUS USTAT: NORMAL
WBC # BLD AUTO: 14.2 K/UL (ref 4.8–10.8)
WBC # BLD AUTO: NORMAL K/UL (ref 4.8–10.8)

## 2022-10-27 PROCEDURE — 00B70ZZ EXCISION OF CEREBRAL HEMISPHERE, OPEN APPROACH: ICD-10-PCS | Performed by: NEUROLOGICAL SURGERY

## 2022-10-27 PROCEDURE — A9270 NON-COVERED ITEM OR SERVICE: HCPCS | Performed by: NEUROLOGICAL SURGERY

## 2022-10-27 PROCEDURE — 94002 VENT MGMT INPAT INIT DAY: CPT

## 2022-10-27 PROCEDURE — 700111 HCHG RX REV CODE 636 W/ 250 OVERRIDE (IP): Performed by: RADIOLOGY

## 2022-10-27 PROCEDURE — 70450 CT HEAD/BRAIN W/O DYE: CPT

## 2022-10-27 PROCEDURE — 700101 HCHG RX REV CODE 250: Performed by: NEUROLOGICAL SURGERY

## 2022-10-27 PROCEDURE — 00N00ZZ RELEASE BRAIN, OPEN APPROACH: ICD-10-PCS | Performed by: NEUROLOGICAL SURGERY

## 2022-10-27 PROCEDURE — 700105 HCHG RX REV CODE 258: Performed by: ANESTHESIOLOGY

## 2022-10-27 PROCEDURE — 80053 COMPREHEN METABOLIC PANEL: CPT

## 2022-10-27 PROCEDURE — 36430 TRANSFUSION BLD/BLD COMPNT: CPT

## 2022-10-27 PROCEDURE — 93005 ELECTROCARDIOGRAM TRACING: CPT | Performed by: EMERGENCY MEDICINE

## 2022-10-27 PROCEDURE — 83605 ASSAY OF LACTIC ACID: CPT

## 2022-10-27 PROCEDURE — 86901 BLOOD TYPING SEROLOGIC RH(D): CPT

## 2022-10-27 PROCEDURE — 160048 HCHG OR STATISTICAL LEVEL 1-5: Performed by: NEUROLOGICAL SURGERY

## 2022-10-27 PROCEDURE — 99292 CRITICAL CARE ADDL 30 MIN: CPT | Mod: 25 | Performed by: INTERNAL MEDICINE

## 2022-10-27 PROCEDURE — 71045 X-RAY EXAM CHEST 1 VIEW: CPT

## 2022-10-27 PROCEDURE — 85610 PROTHROMBIN TIME: CPT

## 2022-10-27 PROCEDURE — 70498 CT ANGIOGRAPHY NECK: CPT

## 2022-10-27 PROCEDURE — 5A1945Z RESPIRATORY VENTILATION, 24-96 CONSECUTIVE HOURS: ICD-10-PCS | Performed by: INTERNAL MEDICINE

## 2022-10-27 PROCEDURE — 03CG3Z7 EXTIRPATION OF MATTER FROM INTRACRANIAL ARTERY USING STENT RETRIEVER, PERCUTANEOUS APPROACH: ICD-10-PCS | Performed by: RADIOLOGY

## 2022-10-27 PROCEDURE — 61645 PERQ ART M-THROMBECT &/NFS: CPT

## 2022-10-27 PROCEDURE — 36600 WITHDRAWAL OF ARTERIAL BLOOD: CPT

## 2022-10-27 PROCEDURE — 36415 COLL VENOUS BLD VENIPUNCTURE: CPT

## 2022-10-27 PROCEDURE — 70496 CT ANGIOGRAPHY HEAD: CPT

## 2022-10-27 PROCEDURE — 94799 UNLISTED PULMONARY SVC/PX: CPT

## 2022-10-27 PROCEDURE — 00U20KZ SUPPLEMENT DURA MATER WITH NONAUTOLOGOUS TISSUE SUBSTITUTE, OPEN APPROACH: ICD-10-PCS | Performed by: NEUROLOGICAL SURGERY

## 2022-10-27 PROCEDURE — 00210 ANES INTRACRANIAL PX NOS: CPT | Performed by: ANESTHESIOLOGY

## 2022-10-27 PROCEDURE — 700105 HCHG RX REV CODE 258: Performed by: INTERNAL MEDICINE

## 2022-10-27 PROCEDURE — 700101 HCHG RX REV CODE 250: Performed by: INTERNAL MEDICINE

## 2022-10-27 PROCEDURE — 302214 INTUBATION BOX: Performed by: INTERNAL MEDICINE

## 2022-10-27 PROCEDURE — 86850 RBC ANTIBODY SCREEN: CPT

## 2022-10-27 PROCEDURE — 85730 THROMBOPLASTIN TIME PARTIAL: CPT

## 2022-10-27 PROCEDURE — 770022 HCHG ROOM/CARE - ICU (200)

## 2022-10-27 PROCEDURE — 700117 HCHG RX CONTRAST REV CODE 255: Performed by: RADIOLOGY

## 2022-10-27 PROCEDURE — 160029 HCHG SURGERY MINUTES - 1ST 30 MINS LEVEL 4: Performed by: NEUROLOGICAL SURGERY

## 2022-10-27 PROCEDURE — 36620 INSERTION CATHETER ARTERY: CPT | Performed by: INTERNAL MEDICINE

## 2022-10-27 PROCEDURE — 03HY32Z INSERTION OF MONITORING DEVICE INTO UPPER ARTERY, PERCUTANEOUS APPROACH: ICD-10-PCS | Performed by: INTERNAL MEDICINE

## 2022-10-27 PROCEDURE — 80048 BASIC METABOLIC PNL TOTAL CA: CPT | Mod: 91

## 2022-10-27 PROCEDURE — C1760 CLOSURE DEV, VASC: HCPCS

## 2022-10-27 PROCEDURE — 36620 INSERTION CATHETER ARTERY: CPT | Performed by: ANESTHESIOLOGY

## 2022-10-27 PROCEDURE — 99140 ANES COMP EMERGENCY COND: CPT | Performed by: ANESTHESIOLOGY

## 2022-10-27 PROCEDURE — 700111 HCHG RX REV CODE 636 W/ 250 OVERRIDE (IP): Performed by: ANESTHESIOLOGY

## 2022-10-27 PROCEDURE — 99221 1ST HOSP IP/OBS SF/LOW 40: CPT | Performed by: NURSE PRACTITIONER

## 2022-10-27 PROCEDURE — 36556 INSERT NON-TUNNEL CV CATH: CPT

## 2022-10-27 PROCEDURE — 700102 HCHG RX REV CODE 250 W/ 637 OVERRIDE(OP): Performed by: NEUROLOGICAL SURGERY

## 2022-10-27 PROCEDURE — 85025 COMPLETE CBC W/AUTO DIFF WBC: CPT | Mod: 91

## 2022-10-27 PROCEDURE — 700106 HCHG RX REV CODE 271: Performed by: NEUROLOGICAL SURGERY

## 2022-10-27 PROCEDURE — 0BH17EZ INSERTION OF ENDOTRACHEAL AIRWAY INTO TRACHEA, VIA NATURAL OR ARTIFICIAL OPENING: ICD-10-PCS | Performed by: INTERNAL MEDICINE

## 2022-10-27 PROCEDURE — 037G3ZZ DILATION OF INTRACRANIAL ARTERY, PERCUTANEOUS APPROACH: ICD-10-PCS | Performed by: RADIOLOGY

## 2022-10-27 PROCEDURE — 31500 INSERT EMERGENCY AIRWAY: CPT

## 2022-10-27 PROCEDURE — 31500 INSERT EMERGENCY AIRWAY: CPT | Performed by: INTERNAL MEDICINE

## 2022-10-27 PROCEDURE — 84484 ASSAY OF TROPONIN QUANT: CPT

## 2022-10-27 PROCEDURE — 160041 HCHG SURGERY MINUTES - EA ADDL 1 MIN LEVEL 4: Performed by: NEUROLOGICAL SURGERY

## 2022-10-27 PROCEDURE — 99291 CRITICAL CARE FIRST HOUR: CPT | Mod: 25 | Performed by: INTERNAL MEDICINE

## 2022-10-27 PROCEDURE — 36556 INSERT NON-TUNNEL CV CATH: CPT | Mod: 59 | Performed by: ANESTHESIOLOGY

## 2022-10-27 PROCEDURE — 700111 HCHG RX REV CODE 636 W/ 250 OVERRIDE (IP): Performed by: NEUROLOGICAL SURGERY

## 2022-10-27 PROCEDURE — 36556 INSERT NON-TUNNEL CV CATH: CPT | Mod: LT | Performed by: INTERNAL MEDICINE

## 2022-10-27 PROCEDURE — 700105 HCHG RX REV CODE 258: Performed by: NURSE PRACTITIONER

## 2022-10-27 PROCEDURE — 94003 VENT MGMT INPAT SUBQ DAY: CPT

## 2022-10-27 PROCEDURE — 94760 N-INVAS EAR/PLS OXIMETRY 1: CPT

## 2022-10-27 PROCEDURE — 0042T CT-CEREBRAL PERFUSION ANALYSIS: CPT

## 2022-10-27 PROCEDURE — 700117 HCHG RX CONTRAST REV CODE 255: Performed by: EMERGENCY MEDICINE

## 2022-10-27 PROCEDURE — 700101 HCHG RX REV CODE 250: Performed by: NURSE PRACTITIONER

## 2022-10-27 PROCEDURE — 03CG0ZZ EXTIRPATION OF MATTER FROM INTRACRANIAL ARTERY, OPEN APPROACH: ICD-10-PCS | Performed by: NEUROLOGICAL SURGERY

## 2022-10-27 PROCEDURE — P9034 PLATELETS, PHERESIS: HCPCS

## 2022-10-27 PROCEDURE — 86900 BLOOD TYPING SEROLOGIC ABO: CPT

## 2022-10-27 PROCEDURE — 99152 MOD SED SAME PHYS/QHP 5/>YRS: CPT

## 2022-10-27 PROCEDURE — 700101 HCHG RX REV CODE 250: Performed by: ANESTHESIOLOGY

## 2022-10-27 PROCEDURE — 4410588 IR-CONSULT AND TREAT

## 2022-10-27 PROCEDURE — 0WC10ZZ EXTIRPATION OF MATTER FROM CRANIAL CAVITY, OPEN APPROACH: ICD-10-PCS | Performed by: NEUROLOGICAL SURGERY

## 2022-10-27 PROCEDURE — 99291 CRITICAL CARE FIRST HOUR: CPT

## 2022-10-27 PROCEDURE — 30233R1 TRANSFUSION OF NONAUTOLOGOUS PLATELETS INTO PERIPHERAL VEIN, PERCUTANEOUS APPROACH: ICD-10-PCS | Performed by: RADIOLOGY

## 2022-10-27 PROCEDURE — 02HV33Z INSERTION OF INFUSION DEVICE INTO SUPERIOR VENA CAVA, PERCUTANEOUS APPROACH: ICD-10-PCS | Performed by: INTERNAL MEDICINE

## 2022-10-27 PROCEDURE — 700117 HCHG RX CONTRAST REV CODE 255: Performed by: PSYCHIATRY & NEUROLOGY

## 2022-10-27 PROCEDURE — 85025 COMPLETE CBC W/AUTO DIFF WBC: CPT

## 2022-10-27 PROCEDURE — 160009 HCHG ANES TIME/MIN: Performed by: NEUROLOGICAL SURGERY

## 2022-10-27 PROCEDURE — 93005 ELECTROCARDIOGRAM TRACING: CPT | Performed by: INTERNAL MEDICINE

## 2022-10-27 PROCEDURE — 82803 BLOOD GASES ANY COMBINATION: CPT

## 2022-10-27 PROCEDURE — 36620 INSERTION CATHETER ARTERY: CPT

## 2022-10-27 PROCEDURE — 93010 ELECTROCARDIOGRAM REPORT: CPT | Performed by: INTERNAL MEDICINE

## 2022-10-27 PROCEDURE — 110371 HCHG SHELL REV 272: Performed by: NEUROLOGICAL SURGERY

## 2022-10-27 PROCEDURE — 700111 HCHG RX REV CODE 636 W/ 250 OVERRIDE (IP): Performed by: INTERNAL MEDICINE

## 2022-10-27 DEVICE — DUREPAIR 4X5: Type: IMPLANTABLE DEVICE | Site: CRANIAL | Status: FUNCTIONAL

## 2022-10-27 RX ORDER — ALBUTEROL SULFATE 90 UG/1
2 AEROSOL, METERED RESPIRATORY (INHALATION) EVERY 4 HOURS PRN
Status: SHIPPED | COMMUNITY
End: 2022-10-27

## 2022-10-27 RX ORDER — ROCURONIUM BROMIDE 10 MG/ML
50 INJECTION, SOLUTION INTRAVENOUS ONCE
Status: COMPLETED | OUTPATIENT
Start: 2022-10-27 | End: 2022-10-27

## 2022-10-27 RX ORDER — IPRATROPIUM BROMIDE AND ALBUTEROL SULFATE 2.5; .5 MG/3ML; MG/3ML
3 SOLUTION RESPIRATORY (INHALATION)
Status: DISCONTINUED | OUTPATIENT
Start: 2022-10-27 | End: 2022-10-28

## 2022-10-27 RX ORDER — HYDRALAZINE HYDROCHLORIDE 20 MG/ML
10 INJECTION INTRAMUSCULAR; INTRAVENOUS
Status: DISCONTINUED | OUTPATIENT
Start: 2022-10-27 | End: 2022-10-27

## 2022-10-27 RX ORDER — CEFAZOLIN SODIUM 1 G/3ML
INJECTION, POWDER, FOR SOLUTION INTRAMUSCULAR; INTRAVENOUS
Status: DISCONTINUED | OUTPATIENT
Start: 2022-10-27 | End: 2022-10-27 | Stop reason: HOSPADM

## 2022-10-27 RX ORDER — POLYETHYLENE GLYCOL 3350 17 G/17G
1 POWDER, FOR SOLUTION ORAL
Status: DISCONTINUED | OUTPATIENT
Start: 2022-10-27 | End: 2022-10-28

## 2022-10-27 RX ORDER — SODIUM CHLORIDE 9 MG/ML
INJECTION, SOLUTION INTRAVENOUS
Status: DISCONTINUED | OUTPATIENT
Start: 2022-10-27 | End: 2022-10-27 | Stop reason: SURG

## 2022-10-27 RX ORDER — EMPAGLIFLOZIN 25 MG/1
25 TABLET, FILM COATED ORAL DAILY
Status: SHIPPED | COMMUNITY
End: 2022-10-27

## 2022-10-27 RX ORDER — HYDRALAZINE HYDROCHLORIDE 20 MG/ML
10 INJECTION INTRAMUSCULAR; INTRAVENOUS
Status: DISCONTINUED | OUTPATIENT
Start: 2022-10-27 | End: 2022-10-28

## 2022-10-27 RX ORDER — MAGNESIUM HYDROXIDE 1200 MG/15ML
LIQUID ORAL
Status: COMPLETED | OUTPATIENT
Start: 2022-10-27 | End: 2022-10-27

## 2022-10-27 RX ORDER — ONDANSETRON 4 MG/1
4 TABLET, ORALLY DISINTEGRATING ORAL EVERY 4 HOURS PRN
Status: DISCONTINUED | OUTPATIENT
Start: 2022-10-27 | End: 2022-10-28

## 2022-10-27 RX ORDER — BISACODYL 10 MG
10 SUPPOSITORY, RECTAL RECTAL
Status: DISCONTINUED | OUTPATIENT
Start: 2022-10-27 | End: 2022-10-28

## 2022-10-27 RX ORDER — ETOMIDATE 2 MG/ML
20 INJECTION INTRAVENOUS ONCE
Status: COMPLETED | OUTPATIENT
Start: 2022-10-27 | End: 2022-10-27

## 2022-10-27 RX ORDER — IODIXANOL 270 MG/ML
60 INJECTION, SOLUTION INTRAVASCULAR ONCE
Status: COMPLETED | OUTPATIENT
Start: 2022-10-27 | End: 2022-10-27

## 2022-10-27 RX ORDER — CEFAZOLIN SODIUM 1 G/3ML
INJECTION, POWDER, FOR SOLUTION INTRAMUSCULAR; INTRAVENOUS PRN
Status: DISCONTINUED | OUTPATIENT
Start: 2022-10-27 | End: 2022-10-27 | Stop reason: SURG

## 2022-10-27 RX ORDER — LEVOTHYROXINE SODIUM 0.03 MG/1
100 TABLET ORAL
Status: DISCONTINUED | OUTPATIENT
Start: 2022-10-27 | End: 2022-10-28

## 2022-10-27 RX ORDER — LEVETIRACETAM 500 MG/5ML
INJECTION, SOLUTION, CONCENTRATE INTRAVENOUS PRN
Status: DISCONTINUED | OUTPATIENT
Start: 2022-10-27 | End: 2022-10-27 | Stop reason: SURG

## 2022-10-27 RX ORDER — OMEPRAZOLE 40 MG/1
40 CAPSULE, DELAYED RELEASE ORAL EVERY MORNING
Status: SHIPPED | COMMUNITY
End: 2022-10-27

## 2022-10-27 RX ORDER — SODIUM CHLORIDE 9 MG/ML
250 INJECTION, SOLUTION INTRAVENOUS ONCE
Status: COMPLETED | OUTPATIENT
Start: 2022-10-27 | End: 2022-10-27

## 2022-10-27 RX ORDER — BACITRACIN ZINC 500 [USP'U]/G
OINTMENT TOPICAL
Status: DISCONTINUED | OUTPATIENT
Start: 2022-10-27 | End: 2022-10-27 | Stop reason: HOSPADM

## 2022-10-27 RX ORDER — MONTELUKAST SODIUM 10 MG/1
10 TABLET ORAL NIGHTLY
Status: DISCONTINUED | OUTPATIENT
Start: 2022-10-27 | End: 2022-10-28

## 2022-10-27 RX ORDER — FAMOTIDINE 20 MG/1
20 TABLET, FILM COATED ORAL EVERY 24 HOURS
Status: DISCONTINUED | OUTPATIENT
Start: 2022-10-27 | End: 2022-10-28

## 2022-10-27 RX ORDER — 3% SODIUM CHLORIDE 3 G/100ML
500 INJECTION, SOLUTION INTRAVENOUS CONTINUOUS
Status: DISCONTINUED | OUTPATIENT
Start: 2022-10-27 | End: 2022-10-28

## 2022-10-27 RX ORDER — AMOXICILLIN 250 MG
2 CAPSULE ORAL 2 TIMES DAILY
Status: DISCONTINUED | OUTPATIENT
Start: 2022-10-27 | End: 2022-10-28

## 2022-10-27 RX ORDER — SODIUM CHLORIDE, SODIUM LACTATE, POTASSIUM CHLORIDE, AND CALCIUM CHLORIDE .6; .31; .03; .02 G/100ML; G/100ML; G/100ML; G/100ML
IRRIGANT IRRIGATION
Status: DISCONTINUED | OUTPATIENT
Start: 2022-10-27 | End: 2022-10-27 | Stop reason: HOSPADM

## 2022-10-27 RX ORDER — BUPIVACAINE HYDROCHLORIDE AND EPINEPHRINE 5; 5 MG/ML; UG/ML
INJECTION, SOLUTION PERINEURAL
Status: DISCONTINUED | OUTPATIENT
Start: 2022-10-27 | End: 2022-10-27 | Stop reason: HOSPADM

## 2022-10-27 RX ORDER — CALCIUM CHLORIDE 100 MG/ML
1 INJECTION INTRAVENOUS; INTRAVENTRICULAR ONCE
Status: DISCONTINUED | OUTPATIENT
Start: 2022-10-27 | End: 2022-10-27

## 2022-10-27 RX ORDER — LISINOPRIL 5 MG/1
5 TABLET ORAL DAILY
Status: SHIPPED | COMMUNITY
End: 2022-10-27

## 2022-10-27 RX ORDER — ATORVASTATIN CALCIUM 40 MG/1
80 TABLET, FILM COATED ORAL EVERY EVENING
Status: DISCONTINUED | OUTPATIENT
Start: 2022-10-27 | End: 2022-10-28

## 2022-10-27 RX ORDER — 3% SODIUM CHLORIDE 3 G/100ML
250 INJECTION, SOLUTION INTRAVENOUS ONCE
Status: DISCONTINUED | OUTPATIENT
Start: 2022-10-27 | End: 2022-10-27

## 2022-10-27 RX ORDER — LABETALOL HYDROCHLORIDE 5 MG/ML
10 INJECTION, SOLUTION INTRAVENOUS
Status: DISCONTINUED | OUTPATIENT
Start: 2022-10-27 | End: 2022-10-28

## 2022-10-27 RX ORDER — AMIODARONE HYDROCHLORIDE 200 MG/1
200 TABLET ORAL DAILY
Status: DISCONTINUED | OUTPATIENT
Start: 2022-10-27 | End: 2022-10-28

## 2022-10-27 RX ORDER — SODIUM CHLORIDE 9 MG/ML
INJECTION, SOLUTION INTRAVENOUS CONTINUOUS
Status: DISCONTINUED | OUTPATIENT
Start: 2022-10-27 | End: 2022-10-28

## 2022-10-27 RX ORDER — LABETALOL HYDROCHLORIDE 5 MG/ML
10 INJECTION, SOLUTION INTRAVENOUS
Status: DISCONTINUED | OUTPATIENT
Start: 2022-10-27 | End: 2022-10-27

## 2022-10-27 RX ORDER — ONDANSETRON 2 MG/ML
4 INJECTION INTRAMUSCULAR; INTRAVENOUS EVERY 4 HOURS PRN
Status: DISCONTINUED | OUTPATIENT
Start: 2022-10-27 | End: 2022-10-28

## 2022-10-27 RX ORDER — IODIXANOL 270 MG/ML
75 INJECTION, SOLUTION INTRAVASCULAR ONCE
Status: COMPLETED | OUTPATIENT
Start: 2022-10-27 | End: 2022-10-27

## 2022-10-27 RX ADMIN — IODIXANOL 60 ML: 270 INJECTION, SOLUTION INTRAVASCULAR at 21:45

## 2022-10-27 RX ADMIN — SODIUM CHLORIDE 250 ML: 9 INJECTION, SOLUTION INTRAVENOUS at 13:30

## 2022-10-27 RX ADMIN — FENTANYL CITRATE 100 MCG: 50 INJECTION, SOLUTION INTRAMUSCULAR; INTRAVENOUS at 16:29

## 2022-10-27 RX ADMIN — ROCURONIUM BROMIDE 50 MG: 50 INJECTION INTRAVENOUS at 15:27

## 2022-10-27 RX ADMIN — SODIUM CHLORIDE: 9 INJECTION, SOLUTION INTRAVENOUS at 14:34

## 2022-10-27 RX ADMIN — IODIXANOL 75 ML: 270 INJECTION, SOLUTION INTRAVASCULAR at 12:30

## 2022-10-27 RX ADMIN — LABETALOL HYDROCHLORIDE 10 MG: 5 INJECTION, SOLUTION INTRAVENOUS at 17:26

## 2022-10-27 RX ADMIN — IOHEXOL 100 ML: 350 INJECTION, SOLUTION INTRAVENOUS at 11:01

## 2022-10-27 RX ADMIN — SODIUM CHLORIDE: 9 INJECTION, SOLUTION INTRAVENOUS at 20:59

## 2022-10-27 RX ADMIN — IOHEXOL 100 ML: 350 INJECTION, SOLUTION INTRAVENOUS at 09:48

## 2022-10-27 RX ADMIN — PROPOFOL 5 MCG/KG/MIN: 10 INJECTION, EMULSION INTRAVENOUS at 15:50

## 2022-10-27 RX ADMIN — FAMOTIDINE 20 MG: 10 INJECTION INTRAVENOUS at 17:39

## 2022-10-27 RX ADMIN — IOHEXOL 40 ML: 350 INJECTION, SOLUTION INTRAVENOUS at 10:15

## 2022-10-27 RX ADMIN — PROTHROMBIN, COAGULATION FACTOR VII HUMAN, COAGULATION FACTOR IX HUMAN, COAGULATION FACTOR X HUMAN, PROTEIN C, PROTEIN S HUMAN, AND WATER 4500 UNITS: KIT at 12:12

## 2022-10-27 RX ADMIN — ROCURONIUM BROMIDE 50 MG: 50 INJECTION INTRAVENOUS at 15:23

## 2022-10-27 RX ADMIN — ROCURONIUM BROMIDE 50 MG: 10 INJECTION, SOLUTION INTRAVENOUS at 19:33

## 2022-10-27 RX ADMIN — CEFAZOLIN 2 G: 330 INJECTION, POWDER, FOR SOLUTION INTRAMUSCULAR; INTRAVENOUS at 21:19

## 2022-10-27 RX ADMIN — SODIUM CHLORIDE 500 ML: 3 INJECTION, SOLUTION INTRAVENOUS at 18:01

## 2022-10-27 RX ADMIN — SODIUM CHLORIDE 200 MEQ: 4 INJECTION, SOLUTION, CONCENTRATE INTRAVENOUS at 17:56

## 2022-10-27 RX ADMIN — LEVETIRACETAM 1000 MG: 100 INJECTION, SOLUTION INTRAVENOUS at 21:40

## 2022-10-27 RX ADMIN — ETOMIDATE 20 MG: 2 INJECTION INTRAVENOUS at 15:22

## 2022-10-27 RX ADMIN — IOHEXOL 40 ML: 350 INJECTION, SOLUTION INTRAVENOUS at 11:00

## 2022-10-27 RX ADMIN — ETOMIDATE 20 MG: 2 INJECTION INTRAVENOUS at 15:31

## 2022-10-27 RX ADMIN — SODIUM CHLORIDE: 9 INJECTION, SOLUTION INTRAVENOUS at 17:41

## 2022-10-27 ASSESSMENT — PAIN SCALES - GENERAL: PAIN_LEVEL: 0

## 2022-10-27 ASSESSMENT — PAIN DESCRIPTION - PAIN TYPE: TYPE: ACUTE PAIN

## 2022-10-27 ASSESSMENT — FIBROSIS 4 INDEX: FIB4 SCORE: 6.64

## 2022-10-27 NOTE — PROGRESS NOTES
Cerebral angiogram with mechanical thrombectomy by Dr Joyner assisted by RT Paniagua and Izabella. Emergent consent. Pre-procedure pedal pulses +1 palpable. Right groin access site. Pt placed on monitor, prepped and draped in a sterile fashion.Vital signs were taken every 5 minutes and remained within parameters (see doc flow sheets).Patient initial bp on arrival 97/62  Penumbra system was used to retreive clot. Hemostasis achieved using angioseal deployed at 1136. Report given to Jenny VUONG.Platelets given during procedure. Patient had a CT scan of her head immediately after her procedure then transferred to the ICU.  Pt was transported to ICU with RN and monitor.   Critical lab value of potassium Dr. Joyner informed during procedure.        Initial exam by this RN: 1026    LKW:0800  NIH:7  Time in IR: 1025  Access:1044  1st angio:1049  1st pass:1051  Closure:1136  Stop: 1135      TICI score 2B  @ 1107    Procedure stop time- 1135    Post procedure pedal pulses Palpable and present    Post procedure neuro exam by this RN: 1145    [facility]  REF: [id number]  LOT:[id number]  EXP:05/31/2023  Deployed at 1136

## 2022-10-27 NOTE — ASSESSMENT & PLAN NOTE
History of CAD with PCI with bare-metal stent to the LAD - followed by [facility]  Continue high intensity statin

## 2022-10-27 NOTE — DISCHARGE PLANNING
[facility] Coordination    Referral from: Dr. Nichols    Insurance Provider on Facesheet: None listed @ this time.  Please reach out to a PFA for a screening.     Potential Rehab Diagnosis: CVA?    Chart review indicates patient may have on going medical management and may have therapy needs to possibly meet inpatient rehab facility criteria with the goal of returning to community.    D/C support will need to be verified: None listed    Physiatry consultation pended per protocol.  W/U & TX pending.      Thank you for the referral.

## 2022-10-27 NOTE — ASSESSMENT & PLAN NOTE
History of porcine MVR with aortic aneurysm repair, left atrial appendage ligation and Maze procedure on 12/15/2016 by Dr. Kaufman.

## 2022-10-27 NOTE — OR SURGEON
Immediate Post- Operative Note        Findings: Right M2 occlusion      Procedure(s): Mechanical thrombectpmy     TICI 2b      Estimated Blood Loss: Less than 5 ml        Complications: Transient micro perforation                10/27/2022     12:07 PM     Johny Joyner M.D.

## 2022-10-27 NOTE — ED PROVIDER NOTES
ED Provider Note    CHIEF COMPLAINT  Left-sided weakness    HPI  Radha Gomez is a 67 y.o. female who presents with EMS, the  reported to them that the patient had a stroke 1 week ago at [facility], however that is the extent of the information we received.  It is not clear whether she had a hemorrhagic or ischemic stroke or what part of her body was affected.  She presents today with new onset of left-sided weakness upper and lower extremity and facial droop that began at 8 AM.  The patient denies any chest pain or headache. Further HPI cannot be obtained for the patient secondary to her being a poor historian.    REVIEW OF SYSTEMS  See HPI for further details. All other systems are negative.     PAST MEDICAL HISTORY   has a past medical history of Allergy.    SOCIAL HISTORY  Social History     Tobacco Use    Smoking status: Every Day    Smokeless tobacco: Not on file   Substance and Sexual Activity    Alcohol use: Not on file    Drug use: Not on file    Sexual activity: Not on file       SURGICAL HISTORY   has a past surgical history that includes primary c section.    CURRENT MEDICATIONS  Apixaban      ALLERGIES  No Known Allergies    FAMILY HISTORY  No pertinent family history    PHYSICAL EXAM  VITAL SIGNS: /58   Pulse 68   Resp (!) 30   Ht 1.524 m (5')   Wt 92.5 kg (204 lb)   SpO2 99%   Breastfeeding No   BMI 39.84 kg/m²  @TIFFANY[[id number]::@   Pulse ox interpretation: I interpret this pulse ox as normal.  Constitutional: Alert.  HENT: No signs of trauma, Bilateral external ears normal, Nose normal.   Eyes: Pupils are equal and reactive, Conjunctiva normal, Non-icteric.   Neck: Normal range of motion, No tenderness, Supple, No stridor.   Lymphatic: No lymphadenopathy noted.   Cardiovascular: Regular rate and rhythm, no murmurs.   Thorax & Lungs: Normal breath sounds, No respiratory distress, No wheezing, No chest tenderness.   Abdomen: Bowel sounds normal, Soft, No tenderness, No  masses, No pulsatile masses. No peritoneal signs.  Skin: Warm, Dry, No erythema, No rash.   Back: No bony tenderness, No CVA tenderness.   Extremities: Intact distal pulses, No edema, No tenderness, No cyanosis.  Musculoskeletal: Good range of motion in all major joints. No tenderness to palpation or major deformities noted.   Neurologic: Alert, left facial droop.  Left upper and lower extremity weakness.  Psychiatric: Affect normal, Judgment normal, Mood normal.       DIAGNOSTIC STUDIES / PROCEDURES    EKG  Is a 12-lead EKG interpretation by myself.  It is atrial fibrillation at a rate of 87.  The axis is normal.  The QTC is prolonged 501.  The QRS duration prolonged 167 consistent with right bundle branch block pattern.  There is no ST elevation or depression.  My impression of this EKG, atrial fibrillation, right bundle branch block, does not meet STEMI criteria at this time.    LABS  Labs Reviewed   CBC WITH DIFFERENTIAL - Abnormal; Notable for the following components:       Result Value    WBC 14.2 (*)     RBC 3.57 (*)     Hemoglobin 10.7 (*)     Hematocrit 32.9 (*)     MCHC 32.5 (*)     Platelet Count 82 (*)     Neutrophils-Polys 82.80 (*)     Lymphocytes 7.10 (*)     Immature Granulocytes 1.40 (*)     Neutrophils (Absolute) 11.73 (*)     Monos (Absolute) 1.15 (*)     Immature Granulocytes (abs) 0.20 (*)     All other components within normal limits    Narrative:     Indicate which anticoagulants the patient is on:->UNKNOWN  Biotin intake of greater than 5 mg per day may interfere with  troponin levels, causing false low values.   COMP METABOLIC PANEL - Abnormal; Notable for the following components:    Sodium 131 (*)     Co2 17 (*)     Bun 39 (*)     Creatinine 2.04 (*)     Calcium 8.3 (*)     AST(SGOT) 48 (*)     Albumin 2.4 (*)     Globulin 4.6 (*)     All other components within normal limits    Narrative:     Indicate which anticoagulants the patient is on:->UNKNOWN  Biotin intake of greater than 5 mg  per day may interfere with  troponin levels, causing false low values.   PROTHROMBIN TIME - Abnormal; Notable for the following components:    PT 22.3 (*)     INR 2.01 (*)     All other components within normal limits    Narrative:     Indicate which anticoagulants the patient is on:->UNKNOWN  Biotin intake of greater than 5 mg per day may interfere with  troponin levels, causing false low values.   APTT - Abnormal; Notable for the following components:    APTT 41.1 (*)     All other components within normal limits    Narrative:     Indicate which anticoagulants the patient is on:->UNKNOWN  Biotin intake of greater than 5 mg per day may interfere with  troponin levels, causing false low values.   TROPONIN - Abnormal; Notable for the following components:    Troponin T 29 (*)     All other components within normal limits    Narrative:     Indicate which anticoagulants the patient is on:->UNKNOWN  Biotin intake of greater than 5 mg per day may interfere with  troponin levels, causing false low values.   ESTIMATED GFR - Abnormal; Notable for the following components:    GFR (CKD-EPI) 26 (*)     All other components within normal limits    Narrative:     Indicate which anticoagulants the patient is on:->UNKNOWN  Biotin intake of greater than 5 mg per day may interfere with  troponin levels, causing false low values.   COD (ADULT)   PLATELETS REQUEST   ABO RH CONFIRM   RELEASE PLATELET PHERESIS   TRANSFUSE PLATELET PHERESIS-NURSING COMMUNICATION         RADIOLOGY  CT-CTA NECK WITH & W/O-POST PROCESSING   Preliminary Result      1.  No acute arterial abnormality of the neck.   2.  Severe atherosclerotic narrowing of the distal bilateral vertebral arteries.   3.  Dilated pulmonary arteries, suggestive of pulmonary hypertension.   4.  3 mm left apical nodule. Follow-up recommendations below.      Low Risk: No routine follow-up      High Risk: Optional CT at 12 months      Comments: Nodules less than 6 mm do not require  routine follow-up, but certain patients at high risk with suspicious nodule morphology, upper lobe location, or both may warrant 12-month follow-up.      Low Risk - Minimal or absent history of smoking and of other known risk factors.      High Risk - History of smoking or of other known risk factors.      Note: These recommendations do not apply to lung cancer screening, patients with immunosuppression, or patients with known primary cancer.      Fleischner Society 2017 Guidelines for Management of Incidentally Detected Pulmonary Nodules in Adults         INTERPRETING LOCATION: [address], [place], [address]      CT-CTA HEAD WITH & W/O-POST PROCESS   Preliminary Result      1.  Short segment occlusion within proximal M2 branch of the right middle cerebral artery and a possible additional mid M2 segmental occlusion.   2.  Significant distal bilateral vertebral artery calcified plaque with likely a severe right vertebral stenosis.   These findings were discussed with SAMANTHA AGEE on 10/27/2022 10:00 AM.      INTERPRETING LOCATION: [address], [place], [address]      CT-CEREBRAL PERFUSION ANALYSIS   Final Result      Patient moved during the exam and perfusion analysis could not be performed. Exam is nondiagnostic.      CT-HEAD W/O   Final Result      No acute intracranial abnormality.         INTERPRETING LOCATION: [address], [place], [address]      DX-CHEST-PORTABLE (1 VIEW)   Final Result      1.  Cardiomegaly with mild interstitial thickening suggestive of pulmonary edema.         INTERPRETING LOCATION: [address], [place], [address]      IR-THROMBO MECHANICAL ARTERY,INIT    (Results Pending)   EC-ECHOCARDIOGRAM COMPLETE W/O CONT    (Results Pending)           COURSE & MEDICAL DECISION MAKING  Pertinent Labs & Imaging studies reviewed. (See chart for details)    The patient presents with stroke symptoms currently on anticoagulation.    The patient was met at the charge desk by myself and the stroke team.  CTA with  perfusion was ordered.  Labs were ordered, EKG was ordered.    Although she is not within the window for IV alteplase for stroke, she is on anticoagulation and therefore is not a candidate.    CT shows M2 occlusion, the patient was taken to IR for thrombectomy.  Dr. Pan the neurologist spoke with Dr. Thompson the intensivist.  The patient is in critical condition at this time.      The total critical care time on this patient is 40 minutes, resuscitating patient, speaking with admitting physician, and deciphering test results. This 40 minutes is exclusive of separately billable procedures.          FINAL IMPRESSION  1. Cerebrovascular accident (CVA) due to other mechanism (HCC)        2. Acute ischemic stroke (HCC)  Referral to Physiatry (PMR)    Referral to Neurology        Total critical care time 40 minutes as outlined above         Electronically signed by: Berry Mendez M.D., 10/27/2022 9:54 AM

## 2022-10-27 NOTE — OR SURGEON
Immediate Post- Operative Note        Findings: Right M2 occlusion      Procedure(s): Mechanical thrombectomy -TICI 2b      Estimated Blood Loss: Less than 5 ml        Complications:     Transient Microperforation/contrast extravastation    Stable             10/27/2022     11:53 AM     Johny Joyner M.D.

## 2022-10-27 NOTE — PROGRESS NOTES
Dr. Rankin at bedside to place central line and arterial line.     1643: Timeout performed. Implied consent under emergent circumstances. Unable to reach family at this time.     1700: L IJ central line successfully placed. X-ray placed.

## 2022-10-27 NOTE — ED NOTES
Pharmacy Medication Reconciliation      Med rec waiting for updated patient information before med rec

## 2022-10-27 NOTE — RESPIRATORY CARE
Pt intubated by Dr. Reddy with glidescope. ETT 8.0 measured 24 at the lip secured with etach. Positive color change on EtCO2 detector and bilateral breath sounds auscultated. Pt connected to ventilator at setting ordered.

## 2022-10-27 NOTE — ED TRIAGE NOTES
Chief Complaint   Patient presents with    Facial Droop     Left , onset 0800      Slurred Speech     Pt bib ems from home, per report unknown last known well,  noted weakness and facial droop around 8am. Pt said that she woke up around 6am.  Pt taken to ct then red 11  Fsbs 159  Hypotensive pta: 73/40  EMS said that pt recently d/c from [facility] for stroke.

## 2022-10-27 NOTE — ED PROVIDER NOTES
This version of the note has been redacted during the course of a chart correction case. If you need access to the original text of this version of the note, please contact the Health Information Management department at [phone].

## 2022-10-27 NOTE — ASSESSMENT & PLAN NOTE
History of atrial fibrillation and atrial flutter with prior ablation  Reversed Eliquis with Kcentra/platelets  Continue amiodarone, 200 mg daily  Rate control  Optimize potassium and magnesium  Echocardiogram

## 2022-10-27 NOTE — ED NOTES
RN called lab to inform them of incorrect Pt identification on blood draw performed upon Pt arrival to ED. RN informed lab of incorrect Pt name and correct Pt name and MRN that lab results need to be transferred over to. Lab confirmed names and informed RN that results will be transferred over to this Pt.

## 2022-10-27 NOTE — ASSESSMENT & PLAN NOTE
S/p mechanical thrombectomy with TICI 2b flow complicated by microperforation  Holding antiplatelet therapy due to large ICH/SAH  Hourly neuro checks  Statin therapy  SBP goals > 100< 140, active titration of vasopressors  3% saline infusion for Na goals 150-160

## 2022-10-27 NOTE — PROCEDURES
Intubation    Date/Time: 10/27/2022 3:38 PM  Performed by: Francisca Reddy M.D.  Authorized by: Francisca Reddy M.D.     Consent:     Consent obtained:  Emergent situation    Consent given by:  Healthcare agent    Risks discussed:  Aspiration and death    Alternatives discussed:  No treatment  Pre-procedure details:     Patient status:  Altered mental status    Mallampati score:  I    Pretreatment meds: Etomidate 20mg IV.    Paralytics:  Rocuronium  Procedure details:     Preoxygenation:  Nasal cannula    CPR in progress: no      Intubation method:  Oral    Oral intubation technique:  Video-assisted    Laryngoscope type:  GlideScope    Laryngoscope blade:  Mac 3    Cormack-Lehane Classification:  Grade 1    Tube size (mm):  8.0    Tube type:  Cuffed    Number of attempts:  1    Ventilation between attempts: no      Cricoid pressure: no      Tube visualized through cords: yes    Placement assessment:     ETT to teeth:  23    Tube secured with:  ETT alexander    Breath sounds:  Equal    Placement verification: chest rise, condensation, CXR verification and ETCO2 detector      CXR findings:  ETT in proper place  Post-procedure details:     Patient tolerance of procedure:  Tolerated well, no immediate complications  Comments:      Patient was given 20mg etomidate IV and then 40mg of rocuronium IV through right AC hep lock.  No response to these medications and the patient remained awake.  Repeated dosing of etomidate and rocuronium to left AC hep lock with successful results

## 2022-10-27 NOTE — THERAPY
Missed Therapy     Patient Name: Radha Gomez  Age:  67 y.o., Sex:  female  Medical Record #: [id number]  Today's Date: 10/27/2022       10/27/22 1350   Treatment Variance   Reason For Missed Therapy Medical - Other (Please Comment)   Initial Contact Note    Initial Contact Note  Order Received and Verified, Speech Therapy Evaluation in Progress with Full Report to Follow.   Interdisciplinary Plan of Care Collaboration   IDT Collaboration with  Nursing   Collaboration Comments Attempted to see pt on this date for a clinical swallow evaluation. However, RN reported new neuro changes and not appropriate at this time. Will HOLD and proceed at a later time as appropriate.

## 2022-10-27 NOTE — CONSULTS
Chief Complaint   Patient presents with    Facial Droop     Left , onset 0800      Slurred Speech       Problem List Items Addressed This Visit       * (Principal) Acute ischemic stroke (HCC)     Characterized by left-sided weakness  Imaging reveals right M2 occlusion - to IR for emergent thrombectomy  BP goals based upon TICI flow after thrombectomy  High intensity statin  Echocardiogram, MRI brain, lipid panel         Relevant Medications    atorvastatin (LIPITOR) tablet 80 mg (Start on 10/27/2022  6:00 PM)    labetalol (NORMODYNE/TRANDATE) injection 10 mg    hydrALAZINE (APRESOLINE) injection 10 mg    niCARdipine (CARDENE) 25 mg in  mL Infusion    amiodarone (Cordarone) tablet 200 mg (Start on 10/27/2022 11:45 AM)    Other Relevant Orders    Referral to Physiatry (PMR)    Referral to Neurology     Other Visit Diagnoses       Cerebrovascular accident (CVA) due to other mechanism (HCC)        Relevant Medications    atorvastatin (LIPITOR) tablet 80 mg (Start on 10/27/2022  6:00 PM)    labetalol (NORMODYNE/TRANDATE) injection 10 mg    hydrALAZINE (APRESOLINE) injection 10 mg    niCARdipine (CARDENE) 25 mg in  mL Infusion    amiodarone (Cordarone) tablet 200 mg (Start on 10/27/2022 11:45 AM)          Neurology Stroke Alert Consultation     History of present illness:  This is a 67-year old female with PMHX significant for Afib on Eliquis, AICD/pacemaker, dyslipidemia, hypothyroid, hypertension, obesity, AL who presented to [facility] on 10/27/22 for a chief complaint of Left sided weakness. History somewhat limited; further details obtained via chart review. Reportedly, the patient awoke at 0600 this morning in her usual state of health. At appx 0800, she was noted by her  to develop Left facial weakness, mild LUE/LLE weakness, and slurred speech. EMS was this called. On scene, there was report that the patient was hypotensive; on arrival here SBP 110s. BG reportedly WNL. Stat CT head  revealed no acute intracranial abnormality. CTA head/neck however revealed Right MCA M2 thrombus. Patient was ultimately determined not to be a candidate for IV thrombolytic tx secondary to concurrent use of Eliquis-- last dose last night. NIHSS 6-7. Currently, patient is sitting up in stretcher; awake and alert. Still with the above noted symptoms. She denies headache or dizziness. Planned for emergent IR thrombectomy now per Dr. Finley.     Neurology has been consulted by Dr. Mason Mendez to further evaluate the findings noted above.     Past medical history:   Past Medical History:   Diagnosis Date    Allergy        Past surgical history:   Past Surgical History:   Procedure Laterality Date    PRIMARY C SECTION         Family history:   No family history on file.    Social history:   Social History     Socioeconomic History    Marital status:      Spouse name: Not on file    Number of children: Not on file    Years of education: Not on file    Highest education level: Not on file   Occupational History    Not on file   Tobacco Use    Smoking status: Every Day    Smokeless tobacco: Not on file   Substance and Sexual Activity    Alcohol use: Not on file    Drug use: Not on file    Sexual activity: Not on file   Other Topics Concern    Not on file   Social History Narrative    Not on file     Social Determinants of Health     Financial Resource Strain: Not on file   Food Insecurity: Not on file   Transportation Needs: Not on file   Physical Activity: Not on file   Stress: Not on file   Social Connections: Not on file   Intimate Partner Violence: Not on file   Housing Stability: Not on file       Current medications:   Current Facility-Administered Medications   Medication Dose    Respiratory Therapy Consult      ondansetron (ZOFRAN) syringe/vial injection 4 mg  4 mg    ondansetron (ZOFRAN ODT) dispertab 4 mg  4 mg    atorvastatin (LIPITOR) tablet 80 mg  80 mg    labetalol (NORMODYNE/TRANDATE)  injection 10 mg  10 mg    hydrALAZINE (APRESOLINE) injection 10 mg  10 mg    niCARdipine (CARDENE) 25 mg in  mL Infusion  0-15 mg/hr    MD Alert...ICU Electrolyte Replacement per Pharmacy      levothyroxine (SYNTHROID) tablet 100 mcg  100 mcg    montelukast (SINGULAIR) tablet 10 mg  10 mg    amiodarone (Cordarone) tablet 200 mg  200 mg     Current Outpatient Medications   Medication    diphenhydrAMINE (BENADRYL) 25 MG TABS    Hydrocod Polst-Chlorphen Polst (TUSSICAPS) 10-8 MG CP12       Medication Allergy:  No Known Allergies    Review of systems:   Constitutional: denies fever, night sweats, weight loss.   Eyes: denies acute vision change, eye pain or secretion.   Ears, Nose, Mouth, Throat: denies nasal secretion, nasal bleeding, difficulty swallowing, hearing loss, tinnitus, vertigo, ear pain, acute dental problems, oral ulcers or lesions.   Endocrine: denies recent weight changes, heat or cold intolerance, polyuria, polydypsia, polyphagia,abnormal hair growth.  Cardiovascular: denies new onset of chest pain, palpitations, syncope, or dyspnea of exertion.  Pulmonary: denies shortness of breath, new onset of cough, hemoptysis, wheezing, chest pain or flu-like symptoms.   GI: denies nausea, vomiting, diarrhea, GI bleeding, change in appetite, abdominal pain, and change in bowel habits.  : denies dysuria, urinary incontinence, hematuria.  Heme/oncology: denies history of easy bruising or bleeding. No history of cancer, DVTor PE.  Allergy/immunology: denies hives/urticaria, or itching.   Dermatologic: denies new rash, or new skin lesions.  Musculoskeletal:denies joint swelling or pain, muscle pain, neck and back pain.   Neurologic: As noted in detail above.   Psychiatric: denies symptoms of depression, anxiety, hallucinations, mood swings or changes, suicidal or homicidal thoughts.     Physical examination:   Vitals:    10/27/22 1105 10/27/22 1110 10/27/22 1115 10/27/22 1120   BP: 120/73 114/68     Pulse: 98  85  95   Resp: (!) 26 (!) 31 20 (!) 25   SpO2: 97% 97%     Weight:       Height:           General: Patient in no acute distress, pleasant and cooperative.  HEENT: Normocephalic, no signs of acute trauma.   Neck: supple, no meningeal signs or carotid bruits. There is normal range of motion. No tenderness on exam.   Chest: clear to auscultation. No cough.   CV: RRR, no murmurs.   Skin: no signs of acute rashes or trauma.   Musculoskeletal: joints exhibit full range of motion, without any pain to palpation. There are no signs of joint or muscle swelling. There is no tenderness to deep palpation of muscles.   Psychiatric: No hallucinatory behavior.       NEUROLOGICAL EXAM:   Mental status, orientation: Awake, alert and fully oriented.   Speech and language: speech is fluent, dysarthric; The patient is able to name, repeat and comprehend.   Cranial nerve exam: Pupils are 3-4 mm bilaterally and equally reactive to light. Visual fields are intact by confrontation. There is no nystagmus on primary or secondary gaze. Intact full EOM in all directions of gaze. Face appears symmetric. Sensation in the face is intact to light touch. Uvula is midline. Palate elevates symmetrically. Tongue is midline and without any signs of tongue biting or fasciculations. Shoulder shrug is intact bilaterally.   Motor exam: Strength is 5/5 in RUE/RLE; 4+/5 to LUE/LLE with drift. Left hemineglect present. Tone is normal. No abnormal movements were seen on exam.   Sensory exam reveals normal sense of light touch and pinprick in all extremities. Tactile extinction present on the Left.   Deep tendon reflexes:  Plantar responses are flexor. There is no clonus.   Coordination: shows a normal finger-nose-finger.   Gait: Not assessed at this time as patient is a fall risk         NIH Stroke Scale    1a Level of Consciousness   1b Orientation Questions   1c Response to Commands   2 Gaze   3 Visual Fields   4 Facial Movement 2  5 Motor Function (arm)   a  Left 1  b Right   6 Motor Function (leg)   a Left 1  b Right   7 Limb Ataxia   8 Sensory   9 Language   10 Articulation 2  11 Extinction/Inattention 1    Score: 7        ANCILLARY DATA REVIEWED:     Lab Data Review:  Recent Results (from the past 24 hour(s))   EKG    Collection Time: 10/27/22  9:51 AM   Result Value Ref Range    Report       [facility] Emergency Dept.    Test Date:  2022-10-27  Pt Name:    JOSE A SAUCEDO                     Department: ER  MRN:        [id number]                      Room:       [facility] [place]  Gender:     Female                       Technician: [id number]  :        [id number]                   Requested By:SAMANTHA AGEE  Order #:    [id number]                    Reading MD:    Measurements  Intervals                                Axis  Rate:       87                           P:  WI:                                      QRS:        79  QRSD:       167                          T:          -1  QT:         416  QTc:        501    Interpretive Statements  Atrial fibrillation  Right bundle branch block  No previous ECG available for comparison     CBC WITH DIFFERENTIAL    Collection Time: 10/27/22 10:41 AM   Result Value Ref Range    WBC 14.2 (H) 4.8 - 10.8 K/uL    RBC 3.57 (L) 4.20 - 5.40 M/uL    Hemoglobin 10.7 (L) 12.0 - 16.0 g/dL    Hematocrit 32.9 (L) 37.0 - 47.0 %    MCV 92.2 81.4 - 97.8 fL    MCH 30.0 27.0 - 33.0 pg    MCHC 32.5 (L) 33.6 - 35.0 g/dL    RDW 48.6 35.9 - 50.0 fL    Platelet Count 82 (L) 164 - 446 K/uL    MPV 11.2 9.0 - 12.9 fL    Neutrophils-Polys 82.80 (H) 44.00 - 72.00 %    Lymphocytes 7.10 (L) 22.00 - 41.00 %    Monocytes 8.10 0.00 - 13.40 %    Eosinophils 0.30 0.00 - 6.90 %    Basophils 0.30 0.00 - 1.80 %    Immature Granulocytes 1.40 (H) 0.00 - 0.90 %    Nucleated RBC 0.00 /100 WBC    Neutrophils (Absolute) 11.73 (H) 2.00 - 7.15 K/uL    Lymphs (Absolute) 1.01 1.00 - 4.80 K/uL    Monos (Absolute) 1.15 (H) 0.00 - 0.85 K/uL    Eos (Absolute) 0.04 0.00 -  0.51 K/uL    Baso (Absolute) 0.04 0.00 - 0.12 K/uL    Immature Granulocytes (abs) 0.20 (H) 0.00 - 0.11 K/uL    NRBC (Absolute) 0.00 K/uL   COMP METABOLIC PANEL    Collection Time: 10/27/22 10:41 AM   Result Value Ref Range    Sodium 131 (L) 135 - 145 mmol/L    Potassium 5.1 3.6 - 5.5 mmol/L    Chloride 105 96 - 112 mmol/L    Co2 17 (L) 20 - 33 mmol/L    Anion Gap 9.0 7.0 - 16.0    Glucose 99 65 - 99 mg/dL    Bun 39 (H) 8 - 22 mg/dL    Creatinine 2.04 (H) 0.50 - 1.40 mg/dL    Calcium 8.3 (L) 8.5 - 10.5 mg/dL    AST(SGOT) 48 (H) 12 - 45 U/L    ALT(SGPT) 37 2 - 50 U/L    Alkaline Phosphatase 87 30 - 99 U/L    Total Bilirubin 1.1 0.1 - 1.5 mg/dL    Albumin 2.4 (L) 3.2 - 4.9 g/dL    Total Protein 7.0 6.0 - 8.2 g/dL    Globulin 4.6 (H) 1.9 - 3.5 g/dL    A-G Ratio 0.5 g/dL   TROPONIN    Collection Time: 10/27/22 10:41 AM   Result Value Ref Range    Troponin T 29 (H) 6 - 19 ng/L   ESTIMATED GFR    Collection Time: 10/27/22 10:41 AM   Result Value Ref Range    GFR (CKD-EPI) 26 (A) >60 mL/min/1.73 m 2       Labs reviewed by me.       Imaging reviewed by me:     CT-CTA NECK WITH & W/O-POST PROCESSING   Preliminary Result      1.  No acute arterial abnormality of the neck.   2.  Severe atherosclerotic narrowing of the distal bilateral vertebral arteries.   3.  Dilated pulmonary arteries, suggestive of pulmonary hypertension.   4.  3 mm left apical nodule. Follow-up recommendations below.      Low Risk: No routine follow-up      High Risk: Optional CT at 12 months      Comments: Nodules less than 6 mm do not require routine follow-up, but certain patients at high risk with suspicious nodule morphology, upper lobe location, or both may warrant 12-month follow-up.      Low Risk - Minimal or absent history of smoking and of other known risk factors.      High Risk - History of smoking or of other known risk factors.      Note: These recommendations do not apply to lung cancer screening, patients with immunosuppression, or patients  with known primary cancer.      Fleischner Society 2017 Guidelines for Management of Incidentally Detected Pulmonary Nodules in Adults         INTERPRETING LOCATION: [address], [place], [address]      CT-CTA HEAD WITH & W/O-POST PROCESS   Preliminary Result      1.  Short segment occlusion within proximal M2 branch of the right middle cerebral artery and a possible additional mid M2 segmental occlusion.   2.  Significant distal bilateral vertebral artery calcified plaque with likely a severe right vertebral stenosis.   These findings were discussed with SAMANTHA AGEE on 10/27/2022 10:00 AM.      INTERPRETING LOCATION: [address], [place], [address]      CT-CEREBRAL PERFUSION ANALYSIS   Final Result      Patient moved during the exam and perfusion analysis could not be performed. Exam is nondiagnostic.      CT-HEAD W/O   Final Result      No acute intracranial abnormality.         INTERPRETING LOCATION: [address], [place], [address]      DX-CHEST-PORTABLE (1 VIEW)   Final Result      1.  Cardiomegaly with mild interstitial thickening suggestive of pulmonary edema.         INTERPRETING LOCATION: [address], [place], [address]      IR-THROMBO MECHANICAL ARTERY,INIT    (Results Pending)   EC-ECHOCARDIOGRAM COMPLETE W/O CONT    (Results Pending)         Presumed mechanism by TOAST:  __Large Artery Atherosclerosis  __Small Vessel (Lacunar)  _X_Cardioembolic  __Other (Sickle Cell, Vasculitis, Hypercoagulable)  __Unknown    Modified [place] Scale (MRS): 0 = No symptoms      ASSESSMENT AND PLAN:  67-year old female with PMHX significant for Afib on Eliquis, AICD/pacemaker, dyslipidemia, hypothyroid, hypertension, obesity, AL who presented to [facility] on 10/27/22 for a chief complaint of Left sided weakness/ neglect and dysarthria; NIHSS 7. Here, Stat CT head revealed no acute intracranial abnormality. CTA head/neck however revealed Right MCA M2 thrombus. Patient was ultimately determined not to be a candidate for IV  thrombolytic tx secondary to concurrent use of Eliquis-- last dose last night. Patient now s/p IR thrombectomy Right M2 thrombus with TICI 2b. Further work up is in progress.     Recommendations/Plan:     -Admit to ICU per post IR/thrombectomy protocol.   -Neuro assessment and VS per post thrombectomy protocol. SBP goal to be determined by TICI score (see below). Antihypertensives per ICU team.   -Obtain MRI Brain wo contrast.   -Telemetry; currently SR. No need for TTE.   -Tentative plan to restart Eliquis following MRI Brain, likely tomorrow 10/28/22.   -Atorvastatin 80 mg PO q HS. Check lipid panel.   -Recommend aggressive BG management per primary team. Avoid IVF with Dextrose. BG goal 140-180. Check hemoglobin A1c.   -PT/OT/SLP eval and treat.   -Will follow up with results of the above and make additional recommendations accordingly. All other medical management per primary/ICU team.   -DVT PPX: SCDs.     After the endovascular intervention, please follow the following recommendations regarding blood pressure parameters:    If TICI 3: maintain systolic BP < 140  If TICI 2b: maintain systolic BP < 160  If TICI 2a or less, systolic BP < 180    This is in an effort to minimize reperfusion injury and/or hemorrhagic conversion. Please keep SBP > 100 so as to not hypoperfuse.       The plan of care above has been discussed with Dr. Tanvir Pan. Please call with questions.    DAVID Oates.  [place] [facility] [employer]

## 2022-10-27 NOTE — PROGRESS NOTES
1400 neurological check left upper arm with no effort and no tone. Left lower extremity slight movement in toes to command. Able to state name and town. [place] 2. Follows on the right 4/5.  Updated Dr. Reddy, MIKA Viera, and Dr. Joyner.

## 2022-10-27 NOTE — PROGRESS NOTES
Brief Neurology Progress Note    Follow up for 67-year old female with Hx of Afib on Elqiuis, presented this morning with mild Right MCA syndrome, NIHSS 7. Imaging revealed Right MCA M2 occlusion; IV thrombolytics were not pursued given concurrent use of Eliquis, however patient was brought to IR for urgent Right M2 thrombectomy, with TICI 2b reperfusion.     Following the procedure, the patient developed Right SAH, suspicious for microperforation of Right MCA proximal to M2 (M1 region) CT head at 1218 today further demonstrated the above. The patient received Kcentra and Platelets in effort to minimize further hemorrhage/reverse Eliquis. At appx 1400, the patient had worsening of her neurological exam-- LUE flaccidity, altered mentation. Patient had additional repeat CT head, which demonstrated moderately large Right frontal/temporal intraparenchymal hemorrhage, with suspicion for active bleeding/extravasation. The patient was urgently intubated; now awaiting an additional CT head; if concerning for persistent extravasation, will consider Right MCA sac/embolization, with plan for central line placement for anticipated hyperosmolar tx. Will also consider neurosurgery consultation for consideration of Right hemicrani to mitigate impending malignant cerebral edema.     Additional Recommendations/Plan:     -q1h and PRN neuro assessment. Please notify neurology with changes in exam.   -SBP goal 100-140 strict.   -Hypertonic gtt 3% saline with Na goal 150-155. Current Na is 131; once central line is placed give 23% saline bolus to achieve Na goal and mitigate evolving edema.   -Will follow up with results of the above and make additional recommendations accordingly.     Will follow closely.The above has been discussed with Dr. Tanvir Pan.     Zainab Henning, [email or web address].      **Addendum, 10/27/22 3692: repeat CT head at 1627 essentially unchanged from most recent study; stable appearing Right IPH with mild mass effect and mid  line shift. Plan to forgo Right MCA sac/embolization at this time. Plan for additional repeat CT head at 2230 this evening. Central line placed per ICU team, hypertonics infusing.

## 2022-10-27 NOTE — PROGRESS NOTES
Pulmonary/Critical Care Medicine   Progress Note    Date of service: 10/27/2022  Time: 1500    Called to bedside after multiple updates of patient's worsening mental status and deterioration of neuro exam:  flaccid left upper extremity, mumbling speech, and lethargy without opening eyes.  Reviewed CT head from post IR procedure and then at 1400 with Dr. Pan.  Patient with large IPH and likely continuing to bleed.  Decision to emergently intubate made.    Will need to be started on hypertonic saline.    Going for repeat CT head now.      I spent extensive time in reviewing the patient's condition, physical examination, laboratory and imaging data, prior documentation, in discussion with bedside RN, Dr. Pan, RT, and pharmacy in formulating an assessment/plan.    Additional critical Care time to Dr. Nichols from earlier today: 90 min. No time overlap, procedures not included in time.  [id number] x 2

## 2022-10-27 NOTE — H&P
PULMONARY AND CRITICAL CARE MEDICINE HISTORY AND PHYSICAL EXAMINATION    Date of Admission:  10/27/2022    Admitting Physician:  Chris Nichols MD    Reason for Admission: Acute ischemic stroke    Chief Complaint: Left-sided weakness    History of Present Illness:    I was kindly asked to see and evaluate Radha Gomez, a 67 y.o. female for evaluation and management of the above problem.    This lady has a history of atrial fibrillation and atrial flutter with prior ablation, mitral valve replacement with a porcine valve, aortic aneurysm repair, left atrial appendage ligation and Maze procedure in 2016, permanent pacemaker placement, primary hypertension, dyslipidemia, CAD with prior bare-metal stent placement in the LAD and hypothyroidism.  She was brought into the emergency department today with the acute onset of left-sided weakness and inability to walk.  She was emergently evaluated and imaging reveals a right M2 occlusion and she is going emergently to IR for thrombectomy.  I am told that she was recently dismissed from [facility] for a stroke.  Tragically, there are no medical records available for review.  I have reviewed the case with Dr. Aneesh Pan in the emergency department.    Medications Prior to Admission:    No current facility-administered medications on file prior to encounter.     Current Outpatient Medications on File Prior to Encounter   Medication Sig Dispense Refill    diphenhydrAMINE (BENADRYL) 25 MG TABS Take 25 mg by mouth every 6 hours as needed for Sleep.      Hydrocod Polst-Chlorphen Polst (TUSSICAPS) 10-8 MG CP12 Take 1 Cap by mouth at bedtime as needed. for cough 7 Each 0       Current Medications:      Current Facility-Administered Medications:     Respiratory Therapy Consult, , Nebulization, Continuous RT, Chris Nichols M.D.    ondansetron (ZOFRAN) syringe/vial injection 4 mg, 4 mg, Intravenous, Q4HRS PRN, Chris Nichols M.D.    ondansetron  (ZOFRAN ODT) dispertab 4 mg, 4 mg, Oral, Q4HRS PRN, Chris Nichols M.D.    atorvastatin (LIPITOR) tablet 80 mg, 80 mg, Oral, Q EVENING, Chris Nichols M.D.    labetalol (NORMODYNE/TRANDATE) injection 10 mg, 10 mg, Intravenous, Q10 MIN PRN, Chris Nichols M.D.    hydrALAZINE (APRESOLINE) injection 10 mg, 10 mg, Intravenous, Q2HRS PRN, Chris Nichols M.D.    niCARdipine (CARDENE) 25 mg in  mL Infusion, 0-15 mg/hr, Intravenous, Continuous, Chris Nichols M.D. MD Alert...ICU Electrolyte Replacement per Pharmacy, , Other, PHARMACY TO DOSE, Chris Nichols M.D.    levothyroxine (SYNTHROID) tablet 100 mcg, 100 mcg, Oral, AM ES, Chris Nichols M.D.    montelukast (SINGULAIR) tablet 10 mg, 10 mg, Oral, Nightly, Chris Nichols M.D.    amiodarone (Cordarone) tablet 200 mg, 200 mg, Oral, DAILY, Chris Nichols M.D.    Current Outpatient Medications:     diphenhydrAMINE (BENADRYL) 25 MG TABS, Take 25 mg by mouth every 6 hours as needed for Sleep., Disp: , Rfl:     Hydrocod Polst-Chlorphen Polst (TUSSICAPS) 10-8 MG CP12, Take 1 Cap by mouth at bedtime as needed. for cough, Disp: 7 Each, Rfl: 0    Allergies:    Patient has no known allergies.    Past Surgical History:    Past Surgical History:   Procedure Laterality Date    PRIMARY C SECTION         Past Medical History:    Past Medical History:   Diagnosis Date    Allergy        Social History:    Social History     Socioeconomic History    Marital status:      Spouse name: Not on file    Number of children: Not on file    Years of education: Not on file    Highest education level: Not on file   Occupational History    Not on file   Tobacco Use    Smoking status: Every Day    Smokeless tobacco: Not on file   Substance and Sexual Activity    Alcohol use: Not on file    Drug use: Not on file    Sexual activity: Not on file   Other Topics Concern    Not on file   Social History Narrative    Not on  file     Social Determinants of Health     Financial Resource Strain: Not on file   Food Insecurity: Not on file   Transportation Needs: Not on file   Physical Activity: Not on file   Stress: Not on file   Social Connections: Not on file   Intimate Partner Violence: Not on file   Housing Stability: Not on file       Family History:    No family history on file.    Review of System:    Review of Systems   Unable to perform ROS: Acuity of condition     Physical Examination:    /68   Pulse 95   Resp (!) 25   Ht 1.524 m (5')   Wt 92.5 kg (204 lb)   SpO2 97%   Breastfeeding No   BMI 39.84 kg/m²   Physical Exam  Constitutional:       Appearance: She is not diaphoretic.   HENT:      Head: Normocephalic.      Mouth/Throat:      Pharynx: Oropharynx is clear.   Eyes:      Pupils: Pupils are equal, round, and reactive to light.   Cardiovascular:      Comments: Atrial fibrillation  Pulmonary:      Breath sounds: No wheezing or rales.   Abdominal:      General: There is no distension.      Tenderness: There is no abdominal tenderness.   Musculoskeletal:      Cervical back: Normal range of motion.      Right lower leg: No edema.      Left lower leg: No edema.   Skin:     General: Skin is warm.      Capillary Refill: Capillary refill takes less than 2 seconds.   Neurological:      Comments: She is awake.  Her speech is fluent.  She is weak in the left arm.  She has a left facial droop.       Laboratory Data:        Recent Labs     10/27/22  1041   WBC 14.2*   RBC 3.57*   HEMOGLOBIN 10.7*   HEMATOCRIT 32.9*   MCV 92.2   MCH 30.0   MCHC 32.5*   RDW 48.6   PLATELETCT 82*   MPV 11.2     Recent Labs     10/27/22  1041   SODIUM 131*   POTASSIUM 5.1   CHLORIDE 105   CO2 17*   GLUCOSE 99   BUN 39*   CREATININE 2.04*   CALCIUM 8.3*                   Imaging:    I personally viewed all the available CXR and CT scan images as well as reviewed the radiology interpretation reports.    CT-CTA NECK WITH & W/O-POST PROCESSING    Preliminary Result      1.  No acute arterial abnormality of the neck.   2.  Severe atherosclerotic narrowing of the distal bilateral vertebral arteries.   3.  Dilated pulmonary arteries, suggestive of pulmonary hypertension.   4.  3 mm left apical nodule. Follow-up recommendations below.      Low Risk: No routine follow-up      High Risk: Optional CT at 12 months      Comments: Nodules less than 6 mm do not require routine follow-up, but certain patients at high risk with suspicious nodule morphology, upper lobe location, or both may warrant 12-month follow-up.      Low Risk - Minimal or absent history of smoking and of other known risk factors.      High Risk - History of smoking or of other known risk factors.      Note: These recommendations do not apply to lung cancer screening, patients with immunosuppression, or patients with known primary cancer.      Fleischner Society 2017 Guidelines for Management of Incidentally Detected Pulmonary Nodules in Adults         INTERPRETING LOCATION: [address], [place], [address]      CT-CTA HEAD WITH & W/O-POST PROCESS   Preliminary Result      1.  Short segment occlusion within proximal M2 branch of the right middle cerebral artery and a possible additional mid M2 segmental occlusion.   2.  Significant distal bilateral vertebral artery calcified plaque with likely a severe right vertebral stenosis.   These findings were discussed with SAMANTHA AGEE on 10/27/2022 10:00 AM.      INTERPRETING LOCATION: [address], [place], [address]      CT-CEREBRAL PERFUSION ANALYSIS   Final Result      Patient moved during the exam and perfusion analysis could not be performed. Exam is nondiagnostic.      CT-HEAD W/O   Final Result      No acute intracranial abnormality.         INTERPRETING LOCATION: [address], [place], [address]      DX-CHEST-PORTABLE (1 VIEW)   Final Result      1.  Cardiomegaly with mild interstitial thickening suggestive of pulmonary edema.         INTERPRETING  LOCATION: [address] [place] [address], [address]      IR-THROMBO MECHANICAL ARTERY,INIT    (Results Pending)   EC-ECHOCARDIOGRAM COMPLETE W/O CONT    (Results Pending)       Assessment and Plan:    * Acute ischemic stroke (HCC)- (present on admission)  Assessment & Plan  Characterized by left-sided weakness  Imaging reveals right M2 occlusion - to IR for emergent thrombectomy  BP goals based upon TICI flow after thrombectomy  High intensity statin  Echocardiogram, MRI brain, lipid panel    CAD (coronary artery disease)  Assessment & Plan  History of CAD with PCI with bare-metal stent to the LAD - followed by [facility]  Continue high intensity statin    Primary hypertension  Assessment & Plan  Goal blood pressure based upon TICI flow following thrombectomy    S/P MVR (mitral valve replacement)  Assessment & Plan  History of porcine MVR with aortic aneurysm repair, left atrial appendage ligation and Maze procedure on 12/15/2016 by Dr. Kaufman    AF (atrial fibrillation) ([facility])  Assessment & Plan  History of atrial fibrillation and atrial flutter with prior ablation  Continue amiodarone, 200 mg daily  Rate control  Optimize potassium and magnesium  Echocardiogram    Hypothyroidism  Assessment & Plan  Continue levothyroxine, 100 mcg daily    Dyslipidemia  Assessment & Plan  High intensity statin    This lady is critically ill with an acute ischemic stroke.  I have assessed and reassessed her blood pressure, hemodynamics, cardiovascular status and neurologic status.  She is at increased risk for worsening CNS system dysfunction.    High risk of deterioration and worsening vital organ dysfunction and death without the above critical care interventions.    The patient is critically ill.  Critical care time = 40 minutes in directly providing and coordinating critical care and extensive data review.  No time overlap and excludes procedures.    Discussed with Dr. Pan, RN    Chris Nichols MD  Pulmonary and Critical  Care Medicine

## 2022-10-28 ENCOUNTER — HOME CARE VISIT (OUTPATIENT)
Dept: HOSPICE | Facility: HOSPICE | Age: 67
End: 2022-10-28

## 2022-10-28 ENCOUNTER — APPOINTMENT (OUTPATIENT)
Dept: RADIOLOGY | Facility: MEDICAL CENTER | Age: 67
DRG: 023 | End: 2022-10-28
Attending: INTERNAL MEDICINE
Payer: MEDICARE

## 2022-10-28 ENCOUNTER — HOSPITAL ENCOUNTER (OUTPATIENT)
Dept: RADIOLOGY | Facility: MEDICAL CENTER | Age: 67
End: 2022-10-28
Attending: INTERNAL MEDICINE
Payer: MEDICARE

## 2022-10-28 ENCOUNTER — HOSPICE ADMISSION (OUTPATIENT)
Dept: HOSPICE | Facility: HOSPICE | Age: 67
End: 2022-10-28

## 2022-10-28 VITALS
WEIGHT: 200.4 LBS | RESPIRATION RATE: 38 BRPM | HEIGHT: 64 IN | SYSTOLIC BLOOD PRESSURE: 100 MMHG | DIASTOLIC BLOOD PRESSURE: 47 MMHG | BODY MASS INDEX: 34.21 KG/M2 | OXYGEN SATURATION: 94 % | HEART RATE: 82 BPM | TEMPERATURE: 96.4 F

## 2022-10-28 PROBLEM — R57.9 SHOCK (HCC): Status: ACTIVE | Noted: 2022-10-28

## 2022-10-28 PROBLEM — I62.9 INTRACRANIAL HEMORRHAGE (HCC): Status: ACTIVE | Noted: 2022-10-28

## 2022-10-28 PROBLEM — N17.9 AKI (ACUTE KIDNEY INJURY) (HCC): Status: ACTIVE | Noted: 2022-10-28

## 2022-10-28 PROBLEM — E87.20 LACTIC ACIDOSIS: Status: ACTIVE | Noted: 2022-10-28

## 2022-10-28 LAB
ANION GAP SERPL CALC-SCNC: 15 MMOL/L (ref 7–16)
ANION GAP SERPL CALC-SCNC: 22 MMOL/L (ref 7–16)
ANION GAP SERPL CALC-SCNC: 9 MMOL/L (ref 7–16)
BASE EXCESS BLDA CALC-SCNC: -10 MMOL/L (ref -4–3)
BASE EXCESS BLDA CALC-SCNC: -10 MMOL/L (ref -4–3)
BASE EXCESS BLDA CALC-SCNC: -11 MMOL/L (ref -4–3)
BASOPHILS # BLD AUTO: 0 % (ref 0–1.8)
BASOPHILS # BLD: 0 K/UL (ref 0–0.12)
BODY TEMPERATURE: ABNORMAL DEGREES
BUN SERPL-MCNC: 36 MG/DL (ref 8–22)
BUN SERPL-MCNC: 39 MG/DL (ref 8–22)
BUN SERPL-MCNC: 41 MG/DL (ref 8–22)
CA-I BLD ISE-SCNC: 1.17 MMOL/L (ref 1.1–1.3)
CALCIUM SERPL-MCNC: 7.5 MG/DL (ref 8.5–10.5)
CALCIUM SERPL-MCNC: 7.9 MG/DL (ref 8.5–10.5)
CALCIUM SERPL-MCNC: 8.3 MG/DL (ref 8.5–10.5)
CFT BLD TEG: 11.2 MIN (ref 4.6–9.1)
CFT P HPASE BLD TEG: 9.4 MIN (ref 4.3–8.3)
CHLORIDE SERPL-SCNC: 118 MMOL/L (ref 96–112)
CHLORIDE SERPL-SCNC: 120 MMOL/L (ref 96–112)
CHLORIDE SERPL-SCNC: 120 MMOL/L (ref 96–112)
CHOLEST SERPL-MCNC: 64 MG/DL (ref 100–199)
CLOT ANGLE BLD TEG: 75.5 DEGREES (ref 63–78)
CLOT LYSIS 30M P MA LENFR BLD TEG: 0 % (ref 0–2.6)
CO2 BLDA-SCNC: 17 MMOL/L (ref 20–33)
CO2 SERPL-SCNC: 15 MMOL/L (ref 20–33)
CO2 SERPL-SCNC: 16 MMOL/L (ref 20–33)
CO2 SERPL-SCNC: 8 MMOL/L (ref 20–33)
CREAT SERPL-MCNC: 1.9 MG/DL (ref 0.5–1.4)
CREAT SERPL-MCNC: 2.5 MG/DL (ref 0.5–1.4)
CREAT SERPL-MCNC: 2.85 MG/DL (ref 0.5–1.4)
CT.EXTRINSIC BLD ROTEM: 1 MIN (ref 0.8–2.1)
EOSINOPHIL # BLD AUTO: 0 K/UL (ref 0–0.51)
EOSINOPHIL NFR BLD: 0 % (ref 0–6.9)
ERYTHROCYTE [DISTWIDTH] IN BLOOD BY AUTOMATED COUNT: 53.3 FL (ref 35.9–50)
EST. AVERAGE GLUCOSE BLD GHB EST-MCNC: 111 MG/DL
GFR SERPLBLD CREATININE-BSD FMLA CKD-EPI: 17 ML/MIN/1.73 M 2
GFR SERPLBLD CREATININE-BSD FMLA CKD-EPI: 20 ML/MIN/1.73 M 2
GFR SERPLBLD CREATININE-BSD FMLA CKD-EPI: 28 ML/MIN/1.73 M 2
GLUCOSE SERPL-MCNC: 143 MG/DL (ref 65–99)
GLUCOSE SERPL-MCNC: 155 MG/DL (ref 65–99)
GLUCOSE SERPL-MCNC: 210 MG/DL (ref 65–99)
HBA1C MFR BLD: 5.5 % (ref 4–5.6)
HCO3 BLDA-SCNC: 15.7 MMOL/L (ref 17–25)
HCO3 BLDA-SCNC: 15.8 MMOL/L (ref 17–25)
HCO3 BLDA-SCNC: 16.2 MMOL/L (ref 17–25)
HCT VFR BLD AUTO: 26.2 % (ref 37–47)
HCT VFR BLD CALC: 25 % (ref 37–47)
HDLC SERPL-MCNC: 25 MG/DL
HGB BLD-MCNC: 8.1 G/DL (ref 12–16)
HGB BLD-MCNC: 8.5 G/DL (ref 12–16)
HOROWITZ INDEX BLDA+IHG-RTO: 126 MM[HG]
HOROWITZ INDEX BLDA+IHG-RTO: 127 MM[HG]
HOROWITZ INDEX BLDA+IHG-RTO: 162 MM[HG]
LACTATE BLD-SCNC: 3.7 MMOL/L (ref 0.5–2)
LACTATE BLD-SCNC: 4.1 MMOL/L (ref 0.5–2)
LACTATE SERPL-SCNC: 13.9 MMOL/L (ref 0.5–2)
LACTATE SERPL-SCNC: 4.1 MMOL/L (ref 0.5–2)
LACTATE SERPL-SCNC: 5.7 MMOL/L (ref 0.5–2)
LACTATE SERPL-SCNC: 9.2 MMOL/L (ref 0.5–2)
LDLC SERPL CALC-MCNC: 20 MG/DL
LYMPHOCYTES # BLD AUTO: 0 K/UL (ref 1–4.8)
LYMPHOCYTES NFR BLD: 0 % (ref 22–41)
MAGNESIUM SERPL-MCNC: 2.1 MG/DL (ref 1.5–2.5)
MANUAL DIFF BLD: NORMAL
MCF BLD TEG: 64.4 MM (ref 52–69)
MCF.PLATELET INHIB BLD ROTEM: 45.7 MM (ref 15–32)
MCH RBC QN AUTO: 29.9 PG (ref 27–33)
MCHC RBC AUTO-ENTMCNC: 30.9 G/DL (ref 33.6–35)
MCV RBC AUTO: 96.7 FL (ref 81.4–97.8)
METAMYELOCYTES NFR BLD MANUAL: 0.9 %
MONOCYTES # BLD AUTO: 1.75 K/UL (ref 0–0.85)
MONOCYTES NFR BLD AUTO: 4.3 % (ref 0–13.4)
MORPHOLOGY BLD-IMP: NORMAL
NEUTROPHILS # BLD AUTO: 38.49 K/UL (ref 2–7.15)
NEUTROPHILS NFR BLD: 94.8 % (ref 44–72)
NRBC # BLD AUTO: 0 K/UL
NRBC BLD-RTO: 0 /100 WBC
O2/TOTAL GAS SETTING VFR VENT: 50 %
O2/TOTAL GAS SETTING VFR VENT: 60 %
O2/TOTAL GAS SETTING VFR VENT: 60 %
PCO2 BLDA: 35.2 MMHG (ref 26–37)
PCO2 BLDA: 38.2 MMHG (ref 26–37)
PCO2 BLDA: 38.9 MMHG (ref 26–37)
PCO2 TEMP ADJ BLDA: 35.5 MMHG (ref 26–37)
PCO2 TEMP ADJ BLDA: 38.5 MMHG (ref 26–37)
PCO2 TEMP ADJ BLDA: 39.3 MMHG (ref 26–37)
PH BLDA: 7.21 [PH] (ref 7.4–7.5)
PH BLDA: 7.24 [PH] (ref 7.4–7.5)
PH BLDA: 7.26 [PH] (ref 7.4–7.5)
PH TEMP ADJ BLDA: 7.21 [PH] (ref 7.4–7.5)
PH TEMP ADJ BLDA: 7.23 [PH] (ref 7.4–7.5)
PH TEMP ADJ BLDA: 7.26 [PH] (ref 7.4–7.5)
PHOSPHATE SERPL-MCNC: 6.9 MG/DL (ref 2.5–4.5)
PLATELET # BLD AUTO: 76 K/UL (ref 164–446)
PLATELET BLD QL SMEAR: NORMAL
PMV BLD AUTO: 11.4 FL (ref 9–12.9)
PO2 BLDA: 63 MMHG (ref 64–87)
PO2 BLDA: 76 MMHG (ref 64–87)
PO2 BLDA: 97 MMHG (ref 64–87)
PO2 TEMP ADJ BLDA: 64 MMHG (ref 64–87)
PO2 TEMP ADJ BLDA: 77 MMHG (ref 64–87)
PO2 TEMP ADJ BLDA: 99 MMHG (ref 64–87)
POTASSIUM BLD-SCNC: 5 MMOL/L (ref 3.6–5.5)
POTASSIUM SERPL-SCNC: 4.9 MMOL/L (ref 3.6–5.5)
POTASSIUM SERPL-SCNC: 5 MMOL/L (ref 3.6–5.5)
POTASSIUM SERPL-SCNC: 6.6 MMOL/L (ref 3.6–5.5)
RBC # BLD AUTO: 2.71 M/UL (ref 4.2–5.4)
RBC BLD AUTO: PRESENT
SAO2 % BLDA: 87 % (ref 93–99)
SAO2 % BLDA: 92 % (ref 93–99)
SAO2 % BLDA: 96 % (ref 93–99)
SCCMEC + MECA PNL NOSE NAA+PROBE: NEGATIVE
SODIUM BLD-SCNC: 147 MMOL/L (ref 135–145)
SODIUM SERPL-SCNC: 143 MMOL/L (ref 135–145)
SODIUM SERPL-SCNC: 150 MMOL/L (ref 135–145)
SODIUM SERPL-SCNC: 150 MMOL/L (ref 135–145)
SPECIMEN DRAWN FROM PATIENT: ABNORMAL
T4 FREE SERPL-MCNC: 1.71 NG/DL (ref 0.93–1.7)
TEG ALGORITHM TGALG: ABNORMAL
TOXIC GRANULES BLD QL SMEAR: SLIGHT
TRIGL SERPL-MCNC: 94 MG/DL (ref 0–149)
TSH SERPL DL<=0.005 MIU/L-ACNC: 2.83 UIU/ML (ref 0.38–5.33)
WBC # BLD AUTO: 40.6 K/UL (ref 4.8–10.8)

## 2022-10-28 PROCEDURE — 700101 HCHG RX REV CODE 250: Performed by: INTERNAL MEDICINE

## 2022-10-28 PROCEDURE — 700111 HCHG RX REV CODE 636 W/ 250 OVERRIDE (IP): Performed by: INTERNAL MEDICINE

## 2022-10-28 PROCEDURE — 84439 ASSAY OF FREE THYROXINE: CPT

## 2022-10-28 PROCEDURE — 700101 HCHG RX REV CODE 250: Performed by: NURSE PRACTITIONER

## 2022-10-28 PROCEDURE — 83036 HEMOGLOBIN GLYCOSYLATED A1C: CPT

## 2022-10-28 PROCEDURE — 85025 COMPLETE CBC W/AUTO DIFF WBC: CPT

## 2022-10-28 PROCEDURE — 82803 BLOOD GASES ANY COMBINATION: CPT

## 2022-10-28 PROCEDURE — 700105 HCHG RX REV CODE 258: Performed by: INTERNAL MEDICINE

## 2022-10-28 PROCEDURE — 83605 ASSAY OF LACTIC ACID: CPT

## 2022-10-28 PROCEDURE — 87641 MR-STAPH DNA AMP PROBE: CPT

## 2022-10-28 PROCEDURE — 94799 UNLISTED PULMONARY SVC/PX: CPT

## 2022-10-28 PROCEDURE — 84295 ASSAY OF SERUM SODIUM: CPT

## 2022-10-28 PROCEDURE — 700105 HCHG RX REV CODE 258: Performed by: NURSE PRACTITIONER

## 2022-10-28 PROCEDURE — 80061 LIPID PANEL: CPT

## 2022-10-28 PROCEDURE — 85576 BLOOD PLATELET AGGREGATION: CPT

## 2022-10-28 PROCEDURE — 85347 COAGULATION TIME ACTIVATED: CPT

## 2022-10-28 PROCEDURE — 84132 ASSAY OF SERUM POTASSIUM: CPT

## 2022-10-28 PROCEDURE — 84443 ASSAY THYROID STIM HORMONE: CPT

## 2022-10-28 PROCEDURE — 700111 HCHG RX REV CODE 636 W/ 250 OVERRIDE (IP): Performed by: NURSE PRACTITIONER

## 2022-10-28 PROCEDURE — 85014 HEMATOCRIT: CPT

## 2022-10-28 PROCEDURE — 99233 SBSQ HOSP IP/OBS HIGH 50: CPT | Performed by: NURSE PRACTITIONER

## 2022-10-28 PROCEDURE — 80048 BASIC METABOLIC PNL TOTAL CA: CPT | Mod: 91

## 2022-10-28 PROCEDURE — 84100 ASSAY OF PHOSPHORUS: CPT

## 2022-10-28 PROCEDURE — 82330 ASSAY OF CALCIUM: CPT

## 2022-10-28 PROCEDURE — 83735 ASSAY OF MAGNESIUM: CPT

## 2022-10-28 PROCEDURE — 71045 X-RAY EXAM CHEST 1 VIEW: CPT

## 2022-10-28 PROCEDURE — 85007 BL SMEAR W/DIFF WBC COUNT: CPT

## 2022-10-28 PROCEDURE — 94003 VENT MGMT INPAT SUBQ DAY: CPT

## 2022-10-28 PROCEDURE — 99291 CRITICAL CARE FIRST HOUR: CPT | Performed by: INTERNAL MEDICINE

## 2022-10-28 PROCEDURE — 99292 CRITICAL CARE ADDL 30 MIN: CPT | Performed by: INTERNAL MEDICINE

## 2022-10-28 PROCEDURE — 37799 UNLISTED PX VASCULAR SURGERY: CPT

## 2022-10-28 PROCEDURE — 85384 FIBRINOGEN ACTIVITY: CPT

## 2022-10-28 PROCEDURE — 700111 HCHG RX REV CODE 636 W/ 250 OVERRIDE (IP)

## 2022-10-28 PROCEDURE — 70450 CT HEAD/BRAIN W/O DYE: CPT

## 2022-10-28 PROCEDURE — 36600 WITHDRAWAL OF ARTERIAL BLOOD: CPT

## 2022-10-28 RX ORDER — SODIUM BICARBONATE IN D5W 150/1000ML
PLASTIC BAG, INJECTION (ML) INTRAVENOUS CONTINUOUS
Status: DISCONTINUED | OUTPATIENT
Start: 2022-10-28 | End: 2022-10-28

## 2022-10-28 RX ORDER — GLYCOPYRROLATE 0.2 MG/ML
0.2 INJECTION INTRAMUSCULAR; INTRAVENOUS 3 TIMES DAILY PRN
Status: DISCONTINUED | OUTPATIENT
Start: 2022-10-28 | End: 2022-10-28 | Stop reason: HOSPADM

## 2022-10-28 RX ORDER — CALCIUM CHLORIDE 100 MG/ML
1 INJECTION INTRAVENOUS; INTRAVENTRICULAR ONCE
Status: COMPLETED | OUTPATIENT
Start: 2022-10-28 | End: 2022-10-28

## 2022-10-28 RX ORDER — LINEZOLID 2 MG/ML
600 INJECTION, SOLUTION INTRAVENOUS EVERY 12 HOURS
Status: DISCONTINUED | OUTPATIENT
Start: 2022-10-28 | End: 2022-10-28

## 2022-10-28 RX ORDER — LEVOTHYROXINE SODIUM 0.03 MG/1
100 TABLET ORAL
Status: DISCONTINUED | OUTPATIENT
Start: 2022-10-29 | End: 2022-10-28

## 2022-10-28 RX ORDER — EPINEPHRINE HCL IN 0.9 % NACL 4MG/250ML
0-10 PLASTIC BAG, INJECTION (ML) INTRAVENOUS CONTINUOUS
Status: DISCONTINUED | OUTPATIENT
Start: 2022-10-28 | End: 2022-10-28

## 2022-10-28 RX ORDER — GLYCOPYRROLATE 1 MG/1
1 TABLET ORAL 3 TIMES DAILY PRN
Status: DISCONTINUED | OUTPATIENT
Start: 2022-10-28 | End: 2022-10-28 | Stop reason: HOSPADM

## 2022-10-28 RX ORDER — ATROPINE SULFATE 10 MG/ML
2 SOLUTION/ DROPS OPHTHALMIC EVERY 4 HOURS PRN
Status: DISCONTINUED | OUTPATIENT
Start: 2022-10-28 | End: 2022-10-28 | Stop reason: HOSPADM

## 2022-10-28 RX ORDER — SODIUM CHLORIDE 9 MG/ML
500 INJECTION, SOLUTION INTRAVENOUS ONCE
Status: COMPLETED | OUTPATIENT
Start: 2022-10-28 | End: 2022-10-28

## 2022-10-28 RX ORDER — CARBOXYMETHYLCELLULOSE SODIUM 5 MG/ML
1 SOLUTION/ DROPS OPHTHALMIC PRN
Status: DISCONTINUED | OUTPATIENT
Start: 2022-10-28 | End: 2022-10-28 | Stop reason: HOSPADM

## 2022-10-28 RX ORDER — 3% SODIUM CHLORIDE 3 G/100ML
500 INJECTION, SOLUTION INTRAVENOUS CONTINUOUS
Status: DISCONTINUED | OUTPATIENT
Start: 2022-10-28 | End: 2022-10-28

## 2022-10-28 RX ORDER — ONDANSETRON 4 MG/1
4 TABLET, ORALLY DISINTEGRATING ORAL EVERY 4 HOURS PRN
Status: DISCONTINUED | OUTPATIENT
Start: 2022-10-28 | End: 2022-10-28

## 2022-10-28 RX ORDER — MONTELUKAST SODIUM 10 MG/1
10 TABLET ORAL NIGHTLY
Status: DISCONTINUED | OUTPATIENT
Start: 2022-10-28 | End: 2022-10-28

## 2022-10-28 RX ORDER — LEVETIRACETAM 500 MG/5ML
500 INJECTION, SOLUTION, CONCENTRATE INTRAVENOUS EVERY 12 HOURS
Status: DISCONTINUED | OUTPATIENT
Start: 2022-10-28 | End: 2022-10-28

## 2022-10-28 RX ORDER — NOREPINEPHRINE BITARTRATE 0.03 MG/ML
0-30 INJECTION, SOLUTION INTRAVENOUS CONTINUOUS
Status: DISCONTINUED | OUTPATIENT
Start: 2022-10-28 | End: 2022-10-28

## 2022-10-28 RX ORDER — ONDANSETRON 4 MG/1
8 TABLET, ORALLY DISINTEGRATING ORAL EVERY 8 HOURS PRN
Status: DISCONTINUED | OUTPATIENT
Start: 2022-10-28 | End: 2022-10-28 | Stop reason: HOSPADM

## 2022-10-28 RX ORDER — AMIODARONE HYDROCHLORIDE 200 MG/1
200 TABLET ORAL DAILY
Status: DISCONTINUED | OUTPATIENT
Start: 2022-10-29 | End: 2022-10-28

## 2022-10-28 RX ORDER — SODIUM CHLORIDE, SODIUM LACTATE, POTASSIUM CHLORIDE, AND CALCIUM CHLORIDE .6; .31; .03; .02 G/100ML; G/100ML; G/100ML; G/100ML
500 INJECTION, SOLUTION INTRAVENOUS ONCE
Status: DISCONTINUED | OUTPATIENT
Start: 2022-10-28 | End: 2022-10-28

## 2022-10-28 RX ORDER — SODIUM CHLORIDE, SODIUM LACTATE, POTASSIUM CHLORIDE, AND CALCIUM CHLORIDE .6; .31; .03; .02 G/100ML; G/100ML; G/100ML; G/100ML
250 INJECTION, SOLUTION INTRAVENOUS ONCE
Status: COMPLETED | OUTPATIENT
Start: 2022-10-28 | End: 2022-10-28

## 2022-10-28 RX ORDER — ONDANSETRON 2 MG/ML
8 INJECTION INTRAMUSCULAR; INTRAVENOUS EVERY 8 HOURS PRN
Status: DISCONTINUED | OUTPATIENT
Start: 2022-10-28 | End: 2022-10-28 | Stop reason: HOSPADM

## 2022-10-28 RX ORDER — LORAZEPAM 2 MG/ML
1-4 INJECTION INTRAMUSCULAR
Status: DISCONTINUED | OUTPATIENT
Start: 2022-10-28 | End: 2022-10-28 | Stop reason: HOSPADM

## 2022-10-28 RX ADMIN — NOREPINEPHRINE BITARTRATE 5 MCG/MIN: 1 INJECTION, SOLUTION, CONCENTRATE INTRAVENOUS at 01:22

## 2022-10-28 RX ADMIN — LINEZOLID 600 MG: 600 INJECTION, SOLUTION INTRAVENOUS at 05:21

## 2022-10-28 RX ADMIN — HYDROCORTISONE SODIUM SUCCINATE 100 MG: 100 INJECTION, POWDER, FOR SOLUTION INTRAMUSCULAR; INTRAVENOUS at 03:52

## 2022-10-28 RX ADMIN — EPINEPHRINE 2 MCG/MIN: 1 INJECTION INTRAMUSCULAR; INTRAVENOUS; SUBCUTANEOUS at 13:58

## 2022-10-28 RX ADMIN — PIPERACILLIN AND TAZOBACTAM 3.38 G: 3; .375 INJECTION, POWDER, LYOPHILIZED, FOR SOLUTION INTRAVENOUS; PARENTERAL at 07:59

## 2022-10-28 RX ADMIN — PIPERACILLIN AND TAZOBACTAM 3.38 G: 3; .375 INJECTION, POWDER, LYOPHILIZED, FOR SOLUTION INTRAVENOUS; PARENTERAL at 13:11

## 2022-10-28 RX ADMIN — FAMOTIDINE 20 MG: 10 INJECTION INTRAVENOUS at 06:00

## 2022-10-28 RX ADMIN — Medication 100 MEQ: at 14:11

## 2022-10-28 RX ADMIN — PHENYLEPHRINE HYDROCHLORIDE 300 MCG/MIN: 10 INJECTION INTRAVENOUS at 14:50

## 2022-10-28 RX ADMIN — SODIUM BICARBONATE 50 MEQ: 84 INJECTION INTRAVENOUS at 03:57

## 2022-10-28 RX ADMIN — CALCIUM CHLORIDE 1 G: 100 INJECTION, SOLUTION INTRAVENOUS at 14:12

## 2022-10-28 RX ADMIN — LORAZEPAM 4 MG: 2 INJECTION INTRAMUSCULAR; INTRAVENOUS at 16:01

## 2022-10-28 RX ADMIN — MORPHINE SULFATE 10 MG: 10 INJECTION INTRAVENOUS at 16:01

## 2022-10-28 RX ADMIN — PIPERACILLIN AND TAZOBACTAM 3.38 G: 3; .375 INJECTION, POWDER, LYOPHILIZED, FOR SOLUTION INTRAVENOUS; PARENTERAL at 04:50

## 2022-10-28 RX ADMIN — NOREPINEPHRINE BITARTRATE 30 MCG/MIN: 1 INJECTION, SOLUTION, CONCENTRATE INTRAVENOUS at 13:58

## 2022-10-28 RX ADMIN — SODIUM BICARBONATE 100 MEQ: 84 INJECTION, SOLUTION INTRAVENOUS at 13:10

## 2022-10-28 RX ADMIN — VASOPRESSIN 0.03 UNITS/MIN: 20 INJECTION, SOLUTION INTRAMUSCULAR; SUBCUTANEOUS at 02:53

## 2022-10-28 RX ADMIN — Medication 100 MEQ: at 13:10

## 2022-10-28 RX ADMIN — SODIUM BICARBONATE 100 MEQ: 84 INJECTION INTRAVENOUS at 14:11

## 2022-10-28 RX ADMIN — PHENYLEPHRINE HYDROCHLORIDE 50 MCG/MIN: 10 INJECTION INTRAVENOUS at 04:03

## 2022-10-28 RX ADMIN — Medication 25 MCG/HR: at 12:51

## 2022-10-28 RX ADMIN — VASOPRESSIN 0.03 UNITS/MIN: 20 INJECTION, SOLUTION INTRAMUSCULAR; SUBCUTANEOUS at 13:13

## 2022-10-28 RX ADMIN — SODIUM CHLORIDE 500 ML: 3 INJECTION, SOLUTION INTRAVENOUS at 05:24

## 2022-10-28 RX ADMIN — SODIUM CHLORIDE 500 ML: 9 INJECTION, SOLUTION INTRAVENOUS at 04:00

## 2022-10-28 RX ADMIN — LEVETIRACETAM 500 MG: 100 INJECTION, SOLUTION INTRAVENOUS at 10:52

## 2022-10-28 RX ADMIN — SODIUM CHLORIDE, POTASSIUM CHLORIDE, SODIUM LACTATE AND CALCIUM CHLORIDE 250 ML: 600; 310; 30; 20 INJECTION, SOLUTION INTRAVENOUS at 00:44

## 2022-10-28 RX ADMIN — NOREPINEPHRINE BITARTRATE 27 MCG/MIN: 1 INJECTION, SOLUTION, CONCENTRATE INTRAVENOUS at 10:51

## 2022-10-28 RX ADMIN — SODIUM BICARBONATE: 84 INJECTION, SOLUTION INTRAVENOUS at 14:13

## 2022-10-28 RX ADMIN — SODIUM BICARBONATE 50 MEQ: 84 INJECTION INTRAVENOUS at 01:33

## 2022-10-28 RX ADMIN — NOREPINEPHRINE BITARTRATE 30 MCG/MIN: 1 INJECTION, SOLUTION, CONCENTRATE INTRAVENOUS at 05:02

## 2022-10-28 RX ADMIN — CALCIUM CHLORIDE 1 G: 100 INJECTION INTRAVENOUS; INTRAVENTRICULAR at 04:00

## 2022-10-28 RX ADMIN — HYDROCORTISONE SODIUM SUCCINATE 100 MG: 100 INJECTION, POWDER, FOR SOLUTION INTRAMUSCULAR; INTRAVENOUS at 13:13

## 2022-10-28 RX ADMIN — SODIUM CHLORIDE 500 ML: 3 INJECTION, SOLUTION INTRAVENOUS at 12:36

## 2022-10-28 ASSESSMENT — PAIN DESCRIPTION - PAIN TYPE
TYPE: ACUTE PAIN

## 2022-10-28 ASSESSMENT — FIBROSIS 4 INDEX: FIB4 SCORE: 6.64

## 2022-10-28 NOTE — ASSESSMENT & PLAN NOTE
Worsening UOP/creat  Avoid nephrotoxins  Monitor UOP/creat  May need nephrology, but concerning that patient would like tolerate CRRT/RRT

## 2022-10-28 NOTE — CONSULTS
Chief complaint right temporal parietal intraparenchymal hematoma  Brief HPI this is a 67-year-old woman who presented with a partial stroke was taken by IR for thrombectomy subsequently developed extravasation and of diet as well as beginnings of a hematoma or hematoma propagated over the next several hours and an angiogram was already shot to see if there was a active perforation or vessel that needed to be sacrificed none were identified given her relatively good presentation status of having minimal deficits and some paresthesias as well as her younger stated age was requested by neurosurgery to go forward with decompression given the size of the clot patient was taken directly from IR to the OR for emergent decompressive hemicraniectomy right temporal lobectomy and clot extraction.    12 point review of systems prior to the angiogram the patient was doing very well it is unclear when her exam would have been after the perforation or rupture and large hematoma formation.    Past medical history is per EMR  Past surgical history is relevant for the IR procedure prior to the surgical intervention  Medication she has apixaban which was reversed in the angio suite.    Physical examination at the time of arrival the patient was intubated sedated and paralyzed for the OR.    CT head without contrast demonstrated a extremely large 7 x 4 x 4 cm right sided temporoparietal hematoma with significant amount of clot within the sylvian fissure and she had right to left shift.    Assessment and plan  At this time the patient went for emergent right decompressive hemicraniectomy right temporal lobectomy and extraction extraction of clot with goals to debulk debulk the brain.    Postoperative orders  Normotensive less than 160 preference would be less than 120 systolic blood pressures for the next 24 hours  Keppra 500 twice daily  Every hour neurochecks  CT head in the morning  Sodium goals 135-145  Residual medical management  per ICU as patient will have significant concerns for vasospasm given the amount of clot burden that remains in her sylvian fissure that was not extracted due to concern for vessel injury.    A total of 50 minutes was spent direct patient care coordination consultation and evaluation

## 2022-10-28 NOTE — THERAPY
PT Contact Note    PT Consult received/acknowledged. Discussed with nsg, pt not medically appropriate for PT eval per notes. PT order cancelled at this time.    Fe Borjas, PT, DPT  Ext. [phone]

## 2022-10-28 NOTE — PROGRESS NOTES
Received detailed and thoughtful signout from Dr. Reddy     In brief, 67-year-old female presented with mild right MCA syndrome, NIHSS 7, with imaging revealing right MCA M2 occlusion.  She is on a Eliquis for atrial fibrillation therefore IV thrombolytics were not given and was taken to the IR suite for thrombectomy with tacky to be reperfusion.  Following the procedure, patient developed a right subarachnoid hemorrhage concerning for microperforation of the right MCA.  She was given Kcentra and platelets.  Intubated.  Taken to the ICU for stabilization, central line and arterial line placed by myself, taken back to the IR suite showing's questionable subtle extravasation. Serial CT imaging demonstrated progressively enlarging 7.6 cm right frontotemporal intra-axial hematoma with 7 mm of right to left shift.  Neurosurgery was consulted, underwent decompressive hemicraniectomy with temporal lobe resection and clot evacuation. No EVD placed.  Case discussed with Dr. Pan neurology    Exam post op:  R pupil 6mm, L 3mm, minimally reactive  Flaccid, no spontaneous respirations, absent reflexes, no cough/gag  Train of four 0/4;     -- suspect paralytics still on board given exam above (Rocuronium admin @ 1523, 1527, and 1933); keep propofol @ 5 for now and wean off in AM  -- Keep intubated  -- Q1H neurochecks, inform MD if changes to above  -- Hyperosmolar therapy with goal -160  -- -140  -- CT head in AM    The patient remains critically ill.  Critical care time = 35 minutes in directly providing and coordinating critical care and extensive data review.  No time overlap and excludes procedures.    __________  Jose Rankin MD  Pulmonary and Critical Care Medicine  [facility]

## 2022-10-28 NOTE — CARE PLAN
Pt was intubated today.  VD 1. APVCMV 26/330/+8/50%.  ETT 8.0  @ 24.    Problem: Ventilation  Goal: Ability to achieve and maintain unassisted ventilation or tolerate decreased levels of ventilator support  Description: Target End Date:  4 days     Document on Vent flowsheet    1.  Support and monitor invasive and noninvasive mechanical ventilation  2.  Monitor ventilator weaning response  3.  Perform ventilator associated pneumonia prevention interventions  4.  Manage ventilation therapy by monitoring diagnostic test results  10/27/2022 1724 by Don Reese RRT  Outcome: Progressing

## 2022-10-28 NOTE — ANESTHESIA POSTPROCEDURE EVALUATION
Patient: May BHARTI Gomez    Procedure Summary     Date: 10/27/22 [facility]    Anesthesia Start: 1931 Anesthesia Stop: 2238    Procedures:       IR-CONSULT AND TREAT      RIGHT CRANIECTOMY (Right: Head)      EVACUATION OF HEMATOMA (Right: Head) Diagnosis:     Scheduled Providers: Chin Velazquez M.D.; Aneesh Bloom M.D. Responsible Provider: Kevin Goldstein M.D.    Anesthesia Type: general ASA Status: 4 - Emergent          Final Anesthesia Type: general  Last vitals  BP   Blood Pressure : (!) 83/59, Arterial BP: 145/68    Temp   (!) 35.8 °C (96.4 °F)    Pulse   83   Resp   (!) 71    SpO2   97 %      Anesthesia Post Evaluation    Patient location during evaluation: ICU  Patient participation: complete - patient participated  Level of consciousness: obtunded/minimal responses  Pain score: 0    Airway patency: patent  Anesthetic complications: no  Cardiovascular status: hemodynamically stable  Respiratory status: ETT  Hydration status: euvolemic  Comments: Taken straight from OR to [facility] ICU bed 111, report given    PONV: none          No notable events documented.     Nurse Pain Score: 0 (NPRS)

## 2022-10-28 NOTE — PROCEDURES
Central Line Insertion    Date/Time: 10/27/2022 5:25 PM  Performed by: Jose Rankin M.D.  Authorized by: Jose Rankin M.D.     Consent:     Consent obtained:  Emergent situation  Universal protocol:     Required blood products, implants, devices, and special equipment available: yes      Site/side marked: yes      Immediately prior to procedure, a time out was called: yes      Patient identity confirmed:  Anonymous protocol, patient vented/unresponsive  Pre-procedure details:     Hand hygiene: Hand hygiene performed prior to insertion      Sterile barrier technique: All elements of maximal sterile technique followed      Skin preparation:  ChloraPrep    Skin preparation agent: Skin preparation agent completely dried prior to procedure    Sedation:     Sedation type:  None  Anesthesia:     Anesthesia method:  None  Procedure details:     Location:  L internal jugular    Site selection rationale:  First attempt at L subclavian unable to cannulate vessel. Abandoned and transferred to L IJ    Patient position:  Flat    Catheter size:  7 Fr    Landmarks identified: yes      Ultrasound guidance: yes      Sterile ultrasound techniques: Sterile gel and sterile probe covers were used      Number of attempts:  2    Successful placement: yes    Post-procedure details:     Post-procedure:  Dressing applied and line sutured    Guidewire: guidewire removal confirmed      Assessment:  Blood return through all ports, no pneumothorax on x-ray, free fluid flow and placement verified by x-ray    Patient tolerance of procedure:  Tolerated well, no immediate complications    __________  Jose Rankin MD  Pulmonary and Critical Care Medicine  [facility]

## 2022-10-28 NOTE — PROGRESS NOTES
Campos from Lab called with critical result of K 6.6, CO2 8, Lactic acid 13.9 at 1320. Critical lab result read back to Campos.   Dr. Reddy notified of critical lab result at 1325.  Critical lab result read back by Dr. Reddy.

## 2022-10-28 NOTE — PROGRESS NOTES
Pulmonary/Critical Care Medicine   Progress Note    Date of service: 10/28/2022  Time: 1550    Patient's  and daughter arrived to the bedside.  I explained in details the events of the last 24 hours and despite all of our heroic efforts the patient is actively dying.  The daughter and  would like to proceed with comfort focused care with compassionate extubation.    Francisca Reddy MD  Pulmonary and Critical Care Medicine

## 2022-10-28 NOTE — PROGRESS NOTES
Critical Care Progress Note    Date of admission  10/27/2022    Chief Complaint  67 y.o. female admitted 10/27/2022 with acute right MCA stroke    Hospital Course  Ms. Gomez is a 67 year old lady with the past medical history of atrial fibrillation/flutter s/p ablation on Eliquis, mitral valve replacement s/p MVR with porcine valve, aortic aneurysm repair, left atrial appendage ligation with Maze procedure in 2016, permanent pacemaker, hypertension, dyslipidemia, CAD s/p bare metal stent to the LAD, and hypothyroidism who was admitted on 10/27 after she was found to have a right M2 occlusion after having sudden onset of left-sided weakness and inability to walk.  The patient was not a candidate for thrombolytics given that she was on Eliquis.  She was emergently taken to IR for thrombectomy.  Thrombectomy was achieved with a TICI 2b resultant flow; however, it was complicated by a microperforation to the MCA with subsequent SAH/IPH.  Unfortunately, the patient deteriorated over the course of several hours requiring emergent intubation with both central and arterial line placement. She was started on hypertonic saline.  Her repeat CT head revealed enlarging hematoma with right hemispheric edema, 7mm of right to left shift, and subfalcine herniation.  Neurosurgery was consulted and the patient underwent emergent right decompressive hemicraniectomy with right temporal lobectomy and extraction of the clot.    Interval Problem Update  Reviewed last 24 hour events:   - remains ST in 120-130s, intermittently paced   - -140s   - levo at 27   - vaso at 0.03   - off phenylephrine   - 3% at 30cc/hr   - NS at 100cc/hr   - Tmax 99   - no oral access-->start trickle feeds   - UOP of 200cc overnight, plummer   - BM pta   - no mobility   - LA 5.6   - vent day 2   - CXR(reviewed): cephalization, LLL atelectasis   - scds   - pepcid   - day 1 of zosyn/zyvox   - MRSA pending   - BCs pending   - phos   - K 4.9   - Na 150   - Mg  2.1   - creat 2.5   - WBCs 40   - platelets 76    **worsening hypotension, starting bicarb drip and epinephrine infusions    Review of Systems  Review of Systems   Unable to perform ROS: Intubated      Vital Signs for last 24 hours   Temp:  [35.8 °C (96.4 °F)-36.6 °C (97.9 °F)] 35.8 °C (96.4 °F)  Pulse:  [] 125  Resp:  [20-71] 35  BP: ()/(55-96) 135/91  SpO2:  [77 %-100 %] 94 %    Hemodynamic parameters for last 24 hours       Respiratory Information for the last 24 hours  Vent Mode: APVCMV  Rate (breaths/min): 26  Vt Target (mL): 330  PEEP/CPAP: 8  MAP: 14  Control VTE (exp VT): 331    Physical Exam   Physical Exam  Vitals and nursing note reviewed.   Constitutional:       Appearance: She is obese. She is ill-appearing and toxic-appearing.   HENT:      Head: Normocephalic.      Comments: Large right temporal surgical incision with swelling noted into right periorbital region     Right Ear: External ear normal.      Left Ear: External ear normal.      Nose: Nose normal. No rhinorrhea.      Mouth/Throat:      Mouth: Mucous membranes are moist.      Comments: ETT in place  Eyes:      Conjunctiva/sclera: Conjunctivae normal.      Comments: Scleral edema, right pupil unable to visualize, left pupil 4mm and fixed   Neck:      Comments: Left IJ TLC  Cardiovascular:      Rate and Rhythm: Regular rhythm. Tachycardia present.      Pulses: Normal pulses.      Heart sounds: Normal heart sounds. No murmur heard.  Pulmonary:      Breath sounds: Normal breath sounds. No wheezing.      Comments: Overbreathing the ventilator slightly, clear  Chest:      Chest wall: No tenderness.   Abdominal:      Palpations: Abdomen is soft.      Tenderness: There is no abdominal tenderness. There is no guarding or rebound.   Genitourinary:     Comments: Gonzalez in place  Musculoskeletal:      Cervical back: Normal range of motion and neck supple.      Right lower leg: No edema.      Left lower leg: No edema.   Lymphadenopathy:       Cervical: No cervical adenopathy.   Skin:     General: Skin is warm and dry.      Capillary Refill: Capillary refill takes 2 to 3 seconds.      Findings: No rash.   Neurological:      Comments: Pupils: right unable to visualize due to edema/periorbital swelling, left: 4mm and fixed, ?reflex to right leg with noxious stimulation, flaccid left arm/leg, (+)cough/gag/corneal   Psychiatric:      Comments: Unable to assess       Medications  Current Facility-Administered Medications   Medication Dose Route Frequency Provider Last Rate Last Admin    norepinephrine (Levophed) 8 mg in 250 mL NS infusion (premix)  0-30 mcg/min Intravenous Continuous Nani Salcedo, A.P.R.N. 46.9 mL/hr at 10/28/22 0712 25 mcg/min at 10/28/22 0712    vasopressin (Vasostrict) 20 Units in  mL Infusion  0.03 Units/min Intravenous Continuous Nani Salcedo, A.P.R.N. 9 mL/hr at 10/28/22 0253 0.03 Units/min at 10/28/22 0253    hydrocortisone sodium succinate PF (Solu-CORTEF) 100 MG injection 100 mg  100 mg Intravenous Q8HRS Riley Moore D.O.   100 mg at 10/28/22 0352    piperacillin-tazobactam (Zosyn) 3.375 g in  mL IVPB  3.375 g Intravenous Q8HRS EDWIGE Cruz.O.        phenylephrine 40 mg/250 mL NS premix  0-300 mcg/min Intravenous Continuous EDWIGE Cruz.O.   Paused at 10/28/22 0632    Linezolid (ZYVOX) premix 600 mg  600 mg Intravenous Q12HRS EDWIGE Cruz.O.   Stopped at 10/28/22 0621    3% sodium chloride (HYPERTONIC SALINE) 500mL infusion 500 mL  500 mL Intravenous Continuous EDWIGE Cruz.O. 30 mL/hr at 10/28/22 0711 Rate Verify at 10/28/22 0711    Respiratory Therapy Consult   Nebulization Continuous RT Chris Nichols M.D.        ondansetron (ZOFRAN) syringe/vial injection 4 mg  4 mg Intravenous Q4HRS PRN Chris Nichols M.D.        ondansetron (ZOFRAN ODT) dispertab 4 mg  4 mg Oral Q4HRS PRN Chris Nichols M.D.        atorvastatin (LIPITOR) tablet 80 mg  80 mg  Oral Q EVENING Chris Nichols M.D. MD Alert...ICU Electrolyte Replacement per Pharmacy   Other PHARMACY TO DOSE Chris Nichols M.D.        levothyroxine (SYNTHROID) tablet 100 mcg  100 mcg Oral AM ES Chris Nichols M.D.        montelukast (SINGULAIR) tablet 10 mg  10 mg Oral Nightly Chris Nichols M.D.        amiodarone (Cordarone) tablet 200 mg  200 mg Oral DAILY Chris Nihcols M.D.        hydrALAZINE (APRESOLINE) injection 10 mg  10 mg Intravenous Q2HRS PRN Chris Nichols M.D.        labetalol (NORMODYNE/TRANDATE) injection 10 mg  10 mg Intravenous Q10 MIN PRN Chris Nichols M.D.   10 mg at 10/27/22 1726    niCARdipine (CARDENE) 25 mg in  mL Infusion  0-15 mg/hr Intravenous Continuous Jose Rankin M.D.        NS infusion   Intravenous Continuous Francisca Reddy M.D. 100 mL/hr at 10/27/22 2300 Restarted at 10/27/22 2300    famotidine (PEPCID) tablet 20 mg  20 mg Enteral Tube Q24HRS Francisca Reddy M.D.        Or    famotidine (PEPCID) injection 20 mg  20 mg Intravenous Q24HRS Francisca Reddy M.D.   20 mg at 10/28/22 0600    senna-docusate (PERICOLACE or SENOKOT S) 8.6-50 MG per tablet 2 Tablet  2 Tablet Enteral Tube BID Francisca Reddy M.D.        And    polyethylene glycol/lytes (MIRALAX) PACKET 1 Packet  1 Packet Enteral Tube QDAY PRN Francisca Reddy M.D.        And    magnesium hydroxide (MILK OF MAGNESIA) suspension 30 mL  30 mL Enteral Tube QDAY PRN Francisca Reddy M.D.        And    bisacodyl (DULCOLAX) suppository 10 mg  10 mg Rectal QDAY PRN Francisca Reddy M.D.        lidocaine (XYLOCAINE) 1 % injection 2 mL  2 mL Tracheal Tube Q30 MIN PRN Francisca Reddy M.D.        fentaNYL (SUBLIMAZE) injection 100 mcg  100 mcg Intravenous Q15 MIN PRN Francisca Reddy M.D.   100 mcg at 10/27/22 1629    And    fentaNYL (SUBLIMAZE) injection 200 mcg  200 mcg Intravenous Q15 MIN PRN Francisca Reddy M.D.        And    fentaNYL  (SUBLIMAZE) 50 mcg/mL in 50mL (Continuous Infusion)   Intravenous Continuous Francisca Reddy M.D.        And    propofol (DIPRIVAN) injection  0-80 mcg/kg/min Intravenous Continuous Francisca Reddy M.D.   Paused at 10/28/22 0015    ipratropium-albuterol (DUONEB) nebulizer solution  3 mL Nebulization Q2HRS PRN (RT) Francisca Reddy M.D.        sodium chloride 200 mEq in empty bag 50 mL ivpb  200 mEq Intravenous Q6HRS PRN MIKEY Oates.P.R.N.   Stopped at 10/27/22 1811    sodium chloride 200 mEq in empty bag 50 mL ivpb  200 mEq Intravenous Once MIKEY Oates.P.R.N.           Fluids    Intake/Output Summary (Last 24 hours) at 10/28/2022 0717  Last data filed at 10/28/2022 0600  Gross per 24 hour   Intake 4508.59 ml   Output 1780 ml   Net 2728.59 ml       Laboratory  Recent Labs     10/28/22  0106 10/28/22  0127 10/28/22  0400   ISTATAPH 7.236* 7.213* 7.259*   ISTATAPCO2 38.2* 38.9* 35.2   ISTATAPO2 63* 76 97*   ISTATATCO2 17* 17* 17*   GVSDPYA9XSY 87* 92* 96   ISTATARTHCO3 16.2* 15.7* 15.8*   ISTATARTBE -10* -11* -10*   ISTATTEMP 99.0 F 99.0 F 99.0 F   ISTATFIO2 50 60 60   ISTATSPEC Arterial Arterial Arterial   ISTATAPHTC 7.233* 7.210* 7.256*   RHNFCWRH7GC 64 77 99*         Recent Labs     10/27/22  1743 10/27/22  2332 10/28/22  0513   SODIUM 134* 143 150*   POTASSIUM 4.6 5.0 4.9   CHLORIDE 105 118* 120*   CO2 18* 16* 15*   BUN 38* 36* 39*   CREATININE 1.89* 1.90* 2.50*   MAGNESIUM  --   --  2.1   PHOSPHORUS  --   --  6.9*   CALCIUM 8.7 7.5* 8.3*     Recent Labs     10/27/22  1041 10/27/22  1743 10/27/22  2332 10/28/22  0513   ALTSGPT 37  --   --   --    ASTSGOT 48*  --   --   --    ALKPHOSPHAT 87  --   --   --    TBILIRUBIN 1.1  --   --   --    GLUCOSE 99 182* 210* 155*     Recent Labs     10/27/22  1041 10/28/22  0513   WBC 14.2* 40.6*   NEUTSPOLYS 82.80* 94.80*   LYMPHOCYTES 7.10* 0.00*   MONOCYTES 8.10 4.30   EOSINOPHILS 0.30 0.00   BASOPHILS 0.30 0.00   ASTSGOT 48*  --    ALTSGPT 37  --     ALKPHOSPHAT 87  --    TBILIRUBIN 1.1  --      Recent Labs     10/27/22  1041 10/28/22  0513   RBC 3.57* 2.71*   HEMOGLOBIN 10.7* 8.1*   HEMATOCRIT 32.9* 26.2*   PLATELETCT 82* 76*   PROTHROMBTM 22.3*  --    APTT 41.1*  --    INR 2.01*  --        Imaging  CT head:  1.  Residual or recurrent 3.8 x 3.9 cm parenchymal hemorrhage within the surgical bed.  2.  2.4 cm hyperdense extra-axial hemorrhage along the craniectomy bed.  3.  Effacement of the bilateral ventricles with 1.6 cm right-to-left midline shift, increased since prior study.  4.  Right frontal, parietal, and temporal subarachnoid hemorrhages  5.  Bilateral intraventricular hemorrhages, increased since prior study.  6.  New pneumocephalus related to craniectomy.  7.  Atherosclerosis  8.  Left maxillary sinusitis changes.    Assessment/Plan  * Acute ischemic stroke (HCC)- (present on admission)  Assessment & Plan  S/p mechanical thrombectomy with TICI 2b flow complicated by microperforation  Holding antiplatelet therapy due to large ICH/SAH  Hourly neuro checks  Statin therapy  SBP goals > 100< 140, active titration of vasopressors  3% saline infusion for Na goals 150-160      Lactic acidosis- (present on admission)  Assessment & Plan  Suspect to go ongoing shock  Monitor   MAP goals > 65    REGINALD (acute kidney injury) (HCC)- (present on admission)  Assessment & Plan  Worsening UOP/creat  Avoid nephrotoxins  Monitor UOP/creat  May need nephrology, but concerning that patient would like tolerate CRRT/RRT    Shock (HCC)  Assessment & Plan  ?due to neurogenic shock vs septic  Sepsis protocols ongoing  Following cultures  MAP goals > 65 with vasopressors  Active titration of levophed, vasopressin, phenylephrine  Stress dose steroids  Zosyn/Zovox  Monitor UOP/lactate    Intracranial hemorrhage (HCC)- (present on admission)  Assessment & Plan  Due to microperforation of right M2   S/p Kcentra and platelets  CTA without obvious vessel to embolize  Worsening  hemorrhage with edema and midline shift  Neurosurgery consulted-->s/p emergent right hemicrani with right temporal lobectomy  3% infusion, titrating for Na 150-160  Keppra 500mg IV BID    CAD (coronary artery disease)  Assessment & Plan  History of CAD with PCI with bare-metal stent to the LAD - followed by [facility] cardiology  Continue high intensity statin    Hypothyroidism  Assessment & Plan  Continue levothyroxine, 100 mcg daily    Dyslipidemia  Assessment & Plan  High intensity statin    Primary hypertension  Assessment & Plan  SBP > 100 and < 140  Holding antihypertensives due to shock    S/P MVR (mitral valve replacement)  Assessment & Plan  History of porcine MVR with aortic aneurysm repair, left atrial appendage ligation and Maze procedure on 12/15/2016 by Dr. Kaufman.    AF (atrial fibrillation) ([facility])  Assessment & Plan  History of atrial fibrillation and atrial flutter with prior ablation  Reversed Eliquis with Kcentra/platelets  Continue amiodarone, 200 mg daily  Rate control  Optimize potassium and magnesium  Echocardiogram       VTE:  Contraindicated  Ulcer: H2 Antagonist  Lines: Central Line  Ongoing indication addressed, Arterial Line  Ongoing indication addressed, and Gonzalez Catheter  Ongoing indication addressed    I have performed a physical exam and reviewed and updated ROS and Plan today (10/28/2022). In review of yesterday's note (10/27/2022), there are no changes except as documented above.     Discussed patient condition and risk of morbidity and/or mortality with RN, RT, Therapies, Pharmacy, , Charge nurse / hot rounds, neurology and neurosurgery, and Dr. Joyner    The patient remains critically ill.  I have assessed and reassessed the respiratory status and made ventilator adjustments based upon arterial blood gas analysis, ventilator waveforms and airway mechanics.  I have assessed and reassessed the blood pressure, hemodynamics, cardiovascular status. This patient  remains at high risk for worsening cardiopulmonary dysfunction and death without the above critical care interventions.    Additional critical care time to Dr. Moore from earlier today = 130 minutes in directly providing and coordinating critical care and extensive data review.  No time overlap and excludes procedures.

## 2022-10-28 NOTE — PROCEDURES
"Arterial Line Insertion    Date/Time: 10/27/2022 5:27 PM  Performed by: Jose Rankin M.D.  Authorized by: Jose Rankin M.D.   Consent: The procedure was performed in an emergent situation. Verbal consent not obtained. Written consent not obtained.  Site marked: the operative site was marked  Required items: required blood products, implants, devices, and special equipment available  Patient identity confirmed: anonymous protocol, patient vented/unresponsive  Time out: Immediately prior to procedure a \"time out\" was called to verify the correct patient, procedure, equipment, support staff and site/side marked as required.  Preparation: Patient was prepped and draped in the usual sterile fashion.  Indications: multiple ABGs, respiratory failure and hemodynamic monitoring  Location: right radial  Anesthesia: see MAR for details    Sedation:  Patient sedated: no    Seferino's test normal: yes  Needle gauge: 20  Seldinger technique: Seldinger technique used  Number of attempts: 1  Post-procedure: line sutured and dressing applied  Post-procedure CMS: normal  Patient tolerance: patient tolerated the procedure well with no immediate complications    __________  Jose Rankin MD  Pulmonary and Critical Care Medicine  [facility]      "

## 2022-10-28 NOTE — PROGRESS NOTES
Dr. Rankin and Zainab Henning, APRN updated patient still not withdrawing. Train of 4 with no twitches at 10.

## 2022-10-28 NOTE — ANESTHESIA PREPROCEDURE EVALUATION
Case: [id number] Anesthesia Start Date/Time: 10/27/22 1931    Scheduled Providers: Chin Velazquez M.D.    Surgeons: Aneesh Bloom M.D.    Procedures:       IR-CONSULT AND TREAT      CRANIECTOMY (Right)    Anesthesia type: general    Location: [facility] OR  / SURGERY [facility]; [facility] IMAGING - INTERVENT[facility]AL - [facility]          Relevant Problems   NEURO   (positive) Acute ischemic stroke (HCC)      CARDIAC   (positive) AF (atrial fibrillation) (HCC)   (positive) CAD (coronary artery disease)   (positive) Primary hypertension      ENDO   (positive) Hypothyroidism       Physical Exam    Airway   Mallampati: II  TM distance: >3 FB  Neck ROM: full       Cardiovascular - normal exam  Rhythm: regular  Rate: normal  (-) murmur     Dental - normal exam           Pulmonary - normal exam  Breath sounds clear to auscultation     Abdominal    Neurological - normal exam                 Anesthesia Plan    ASA 4- EMERGENT   ASA physical status 4 criteria: CVA or TIA - recent (< 3 months)ASA physical status emergent criteria: acute hemorrhage    Plan - general       Airway plan will be ETT          Induction: intravenous    Postoperative Plan: Postoperative administration of opioids is intended.    Pertinent diagnostic labs and testing reviewed    Informed Consent:    Anesthetic plan and risks discussed with patient.    Use of blood products discussed with: patient whom consented to blood products.

## 2022-10-28 NOTE — OP REPORT
Surgeon Aneesh Bloom  First assist none available  Preoperative diagnosis is right intraparenchymal temporoparietal hematoma with midline shift intractable intracranial pressure  Postoperative diagnosis same  Procedures  Right decompressive hemicraniectomy greater than 14 cm  Right intraparenchymal hematoma extraction  Right temporal lobectomy  Dural augmentation with dura repair    Complications none known  Blood loss 500 cc  Findings extremely large clot within the right temporal lobe which was extracted including controlled residual clot is within the sylvian fissure subfield dissection of the right temporal lobe was taken the phyllis was left to confine the blood within the sylvian fissure along with the frontal lobe  Implants dura repair underlay  Hemovac drain in the epidural space    Brief HPI please see the consult note from same day  two-physician consent    Procedure in detail patient is brought into the operating room she had been down on the angio suite she was connected to the anesthetic machine and anesthesia was administered her head was then turned the left to right side of her head was clipped prepped and draped in sterile usual fashion for right decompressive hemicraniectomy we then infiltrated the skin with lidocaine with epinephrine clipped prepped and draped in sterile usual fashion trauma timeout was performed we then incised the dermis using a 10 blade sure scalpel and rotated myocutaneous flap anteriorly once the keyhole and the was removed zygoma was exposed we then made bur holes the keyhole or zygoma at the coronal suture line in the parietal region we then did a large trauma flap using B1 footplate we then bit away the residual squamous bone over the middle fossa and controlled any bleeding with bipolar and then bit away the lesser wing of the sphenoid at this point we then opened the dura after placing epidural tack ups in a cruciate fashion the brain was very swollen edematous and there  was contusion along the sylvian fissure in the temporal region we then pulled up to the imaging we noted that there was an extremely large clot that was running throughout the temporal lobe down to the temporal horn of the of the right ventricle and therefore we elected to move forward with a clot extraction and right temporal lobectomy given the amount of swelling that was going on the brain we therefore went through the middle temporal gyrus approximately 6 cm posteriorly and then brought this anteriorly dissecting down into the middle fossa towards the cavernous sinus once we hit the temporal pole we then did a intrapeel dissection removing all of the temporal lobe inferiorly down towards the floor of the middle fossa at this point as we went backwards we noted that there was significant amount of clot that we were encountering once we reached the posterior aspect of where the tentorial incisure was we brought this over to the lateral wall and resected the temporal lobes inferior half approximately 6 cm from the temporal pole once that was removed we then extracted a substantial amount of clot from within the ventricle and within the temporal lobe parietal region itself this area was not particularly bloody however as we went superiorly we could notice that the clot was running into what appeared to be towards the sylvian fissure we therefore elected to move forward with a superior temporal gyrus is long along with the mesial temporal resection we did this today subfield fashion until we ran into the phyllis which time we noted that there was substantial amounts of hematoma within the sylvian fissure and did not wish to violate the peel margin containing that blood clot and likely tamponading a hemorrhagic vessel we did this very carefully the entire right temporal lobe had been removed at this point back approximately 6 cm from the pole at least the majority of it had been and we had at this point were able to reach  into the residual stump to remove large amounts of clot and measured approximately 100 cc worth of clot once that was extracted the temporal lobe as well as the brain significantly relaxed down into the void there is a large defect at the temporal lobe residual temporal lobe and frontal lobe were able to swell into there was some areas of raw tissue which we lined with a thrombin Gelfoam and we controlled any other hemostasis on the raw edges with bipolar at this point there was no significant bleeding coming out and there was no significant CSF coming out of the temporal horn of the ventricle given this finding we felt that we had completed the surgery to inspect the margins to make sure there was no epidural or subdural hematoma we then placed some dural underlay and then closed the native dura over the dural underlay with a 4-0 Nurolon we then placed a medium Hemovac and then closed the skin in layers using 0 Vicryl's on the fascia or galea followed by surgical staples the drain was secured in place and the patient was taken back to the ICU in a guarded position.    All counts correct x2  Is present for the entire the case  Aneesh Bloom MD

## 2022-10-28 NOTE — DISCHARGE PLANNING
Care Transition Team Discharge Planning    Anticipated Discharge Information  Discharge Disposition: D/T to SNF with Medicare cert in anticipation of skilled care (03)     Discharge Plan:  Chart review and assessment completed from information obtained. Patient was BIB EMS from home w/ CVA , PT live in a single level house with spouse ( Isaac Gomez [phone]) .  Spouse is involved with care. Pt with recent admit for CVA, now w/ large hemorrage, currently on vent and pressors , plan for PC to consult to discuss GOC with family ,  Anticipate Home w/ hospice vs comfort care    Care Transition Team Assessment    Information Source  Orientation Level: Unable to assess  Information Given By:  (Chart review)    Readmission Evaluation  Is this a readmission?: No    Elopement Risk  Legal Hold: No  Ambulatory or Self Mobile in Wheelchair: No-Not an Elopement Risk    Discharge Preparedness  What is your plan after discharge?: Skilled nursing facility  What are your discharge supports?: Spouse, Child  Prior Functional Level: Ambulatory    Functional Assesment  Prior Functional Level: Ambulatory    Finances  Financial Barriers to Discharge: No  Prescription Coverage: Yes    Advance Directive  Advance Directive?: None    Domestic Abuse  Have you ever been the victim of abuse or violence?: No    Discharge Risks or Barriers  Discharge risks or barriers?: Post-acute placement / services    Anticipated Discharge Information  Discharge Disposition: D/T to SNF with Medicare cert in anticipation of skilled care (03)    Joselin ADAN, RN [facility]   Care Coordinator

## 2022-10-28 NOTE — PROGRESS NOTES
Chief Complaint   Patient presents with    Facial Droop     Left , onset 0800      Slurred Speech       Problem List Items Addressed This Visit       * (Principal) Acute ischemic stroke (HCC)     Characterized by left-sided weakness  Imaging reveals right M2 occlusion - to IR for emergent thrombectomy  BP goals based upon TICI flow after thrombectomy  High intensity statin  Echocardiogram, MRI brain, lipid panel         Relevant Medications    amiodarone (Cordarone) tablet 200 mg    hydrALAZINE (APRESOLINE) injection 10 mg    labetalol (NORMODYNE/TRANDATE) injection 10 mg    niCARdipine (CARDENE) 25 mg in  mL Infusion    norepinephrine (Levophed) 8 mg in 250 mL NS infusion (premix)    phenylephrine 40 mg/250 mL NS premix    Other Relevant Orders    Referral to Physiatry (PMR)    Referral to Neurology    Intubation (Completed)     Other Visit Diagnoses       Cerebrovascular accident (CVA) due to other mechanism (HCC)        Relevant Medications    amiodarone (Cordarone) tablet 200 mg    hydrALAZINE (APRESOLINE) injection 10 mg    labetalol (NORMODYNE/TRANDATE) injection 10 mg    niCARdipine (CARDENE) 25 mg in  mL Infusion    norepinephrine (Levophed) 8 mg in 250 mL NS infusion (premix)    phenylephrine 40 mg/250 mL NS premix    Other Relevant Orders    Central Line Insertion (Completed)    Arterial Line Insertion (Completed)          Neurology Stroke Progress Note    History of present illness:  This is a 67-year old female with PMHX significant for Afib on Eliquis, AICD/pacemaker, dyslipidemia, hypothyroid, hypertension, obesity, AL who presented to [facility] on 10/27/22 for a chief complaint of Left sided weakness. History somewhat limited; further details obtained via chart review. Reportedly, the patient awoke at 0600 this morning in her usual state of health. At appx 0800, she was noted by her  to develop Left facial weakness, mild LUE/LLE weakness, and slurred speech. EMS was this  called. On scene, there was report that the patient was hypotensive; on arrival here SBP 110s. BG reportedly WNL. Stat CT head revealed no acute intracranial abnormality. CTA head/neck however revealed Right MCA M2 thrombus. Patient was ultimately determined not to be a candidate for IV thrombolytic tx secondary to concurrent use of Eliquis-- last dose last night. NIHSS 6-7. Currently, patient is sitting up in stretcher; awake and alert. Still with the above noted symptoms. She denies headache or dizziness. Planned for emergent IR thrombectomy now per Dr. Finley.     Neurology has been consulted by Dr. Mason Mendez to further evaluate the findings noted above.     Interval, 10/28/22:  Patient is laying in bed; intubated, off sedation.     Interval events:  -To IR at 1027 on 10/27/22 for Right M2 thrombectomy; course was complicated by micro perforation of proximal M2/M1 region, with Right SAH noted immediately following the procedure. Patient received Kcentra, platelets in reversal of eliquis.   -At appx 1200, the patient developed worsening deficits, Left sided weakness, thus went for additional repeat CT head, revealed large Right frontal/temporal hematoma. Patient was intubated, started on hyperosmolar tx. An additional CT head at 1627 demonstrated essentially unchanged IPH; however given concern for active extravasation, the patient went back to IR for diagnostic angio. This revealed possible/probably subtle extravasation for capillary vessel(s) near/surrounding the Right MCA; no need for vessel sacrifice was identified.    -Given hematoma expansion over the following several hours, the patient ultimately required Right hemicraniectomy, performed by Dr. Aneesh Bloom last night.     No changes to HPI as was previously documented.     Past medical history:   Past Medical History:   Diagnosis Date    Allergy        Past surgical history:   Past Surgical History:   Procedure Laterality Date    PRIMARY C  SECTION         Family history:   No family history on file.    Social history:   Social History     Socioeconomic History    Marital status:      Spouse name: Not on file    Number of children: Not on file    Years of education: Not on file    Highest education level: Not on file   Occupational History    Not on file   Tobacco Use    Smoking status: Every Day    Smokeless tobacco: Not on file   Substance and Sexual Activity    Alcohol use: Not on file    Drug use: Not on file    Sexual activity: Not on file   Other Topics Concern    Not on file   Social History Narrative    Not on file     Social Determinants of Health     Financial Resource Strain: Not on file   Food Insecurity: Not on file   Transportation Needs: Not on file   Physical Activity: Not on file   Stress: Not on file   Social Connections: Not on file   Intimate Partner Violence: Not on file   Housing Stability: Not on file       Current medications:   Current Facility-Administered Medications   Medication Dose    norepinephrine (Levophed) 8 mg in 250 mL NS infusion (premix)  0-30 mcg/min    vasopressin (Vasostrict) 20 Units in  mL Infusion  0.03 Units/min    hydrocortisone sodium succinate PF (Solu-CORTEF) 100 MG injection 100 mg  100 mg    piperacillin-tazobactam (Zosyn) 3.375 g in  mL IVPB  3.375 g    phenylephrine 40 mg/250 mL NS premix  0-300 mcg/min    Linezolid (ZYVOX) premix 600 mg  600 mg    3% sodium chloride (HYPERTONIC SALINE) 500mL infusion 500 mL  500 mL    levETIRAcetam (Keppra) injection 500 mg  500 mg    Respiratory Therapy Consult      ondansetron (ZOFRAN) syringe/vial injection 4 mg  4 mg    ondansetron (ZOFRAN ODT) dispertab 4 mg  4 mg    levothyroxine (SYNTHROID) tablet 100 mcg  100 mcg    montelukast (SINGULAIR) tablet 10 mg  10 mg    amiodarone (Cordarone) tablet 200 mg  200 mg    hydrALAZINE (APRESOLINE) injection 10 mg  10 mg    labetalol (NORMODYNE/TRANDATE) injection 10 mg  10 mg    niCARdipine (CARDENE) 25  mg in  mL Infusion  0-15 mg/hr    NS infusion      famotidine (PEPCID) tablet 20 mg  20 mg    Or    famotidine (PEPCID) injection 20 mg  20 mg    senna-docusate (PERICOLACE or SENOKOT S) 8.6-50 MG per tablet 2 Tablet  2 Tablet    And    polyethylene glycol/lytes (MIRALAX) PACKET 1 Packet  1 Packet    And    magnesium hydroxide (MILK OF MAGNESIA) suspension 30 mL  30 mL    And    bisacodyl (DULCOLAX) suppository 10 mg  10 mg    lidocaine (XYLOCAINE) 1 % injection 2 mL  2 mL    fentaNYL (SUBLIMAZE) injection 100 mcg  100 mcg    And    fentaNYL (SUBLIMAZE) injection 200 mcg  200 mcg    And    fentaNYL (SUBLIMAZE) 50 mcg/mL in 50mL (Continuous Infusion)      And    propofol (DIPRIVAN) injection  0-80 mcg/kg/min    ipratropium-albuterol (DUONEB) nebulizer solution  3 mL    sodium chloride 200 mEq in empty bag 50 mL ivpb  200 mEq    sodium chloride 200 mEq in empty bag 50 mL ivpb  200 mEq       Medication Allergy:  No Known Allergies    Review of systems:   Limited as patient is comatose.      Physical examination:   Vitals:    10/28/22 0830 10/28/22 0845 10/28/22 0900 10/28/22 0915   BP:   114/75    Pulse: (!) 124 (!) 120 (!) 122 (!) 125   Resp: (!) 35 (!) 34 (!) 32 (!) 33   Temp:       TempSrc:       SpO2: 97% 98% 98% 99%   Weight:       Height:           General: Patient in no acute distress  HEENT: Normocephalic, no signs of acute trauma.   Neck: supple, no meningeal signs. There is normal range of motion. No tenderness on exam.   Chest: clear to auscultation. No cough.   CV: RRR, no murmurs.   Skin: no signs of acute rashes or trauma.   Musculoskeletal: joints exhibit full range of motion, without any pain to palpation. There are no signs of joint or muscle swelling. There is no tenderness to deep palpation of muscles.   Psychiatric: No hallucinatory behavior.       NEUROLOGICAL EXAM:   Mental status, orientation: Intubated/sedated/comatose.   Speech and language: No verbal output; does not follow commands.    Cranial nerve exam: Pupils are 3 mm on the Left and non reactive; Right unable to be visualized secondary to severe edema to the area. Eyes midline. Does not blink to visual threat. Does not track spontaneously nor to command. Cough/gag/corneal reflexes absent. Occulocephalics intact. Face appears symmetric.  Tongue is midline and without any signs of tongue biting or fasciculations.  Motor exam: No spontaneous or purposeful motor activity. No abnormal movements were seen on exam.   Sensory exam Does not withdrawal to deep nox stim.  Deep tendon reflexes: Plantar responses are upgoing BL. There is no obvious clonus.   Coordination: Deferred, patient does not participate.   Gait: Not assessed at this time as patient is currently unable.      NIH Stroke Scale    1a Level of Consciousness 3  1b Orientation Questions 1  1c Response to Commands 2  2 Gaze   3 Visual Fields 2  4 Facial Movement 2  5 Motor Function (arm)   a Left 4  b Right 4  6 Motor Function (leg)   a Left 4  b Right 4  7 Limb Ataxia   8 Sensory 2  9 Language 3  10 Articulation   11 Extinction/Inattention     Score: 31        ANCILLARY DATA REVIEWED:     Lab Data Review:  Recent Results (from the past 24 hour(s))   CBC WITH DIFFERENTIAL    Collection Time: 10/27/22 10:41 AM   Result Value Ref Range    WBC 14.2 (H) 4.8 - 10.8 K/uL    RBC 3.57 (L) 4.20 - 5.40 M/uL    Hemoglobin 10.7 (L) 12.0 - 16.0 g/dL    Hematocrit 32.9 (L) 37.0 - 47.0 %    MCV 92.2 81.4 - 97.8 fL    MCH 30.0 27.0 - 33.0 pg    MCHC 32.5 (L) 33.6 - 35.0 g/dL    RDW 48.6 35.9 - 50.0 fL    Platelet Count 82 (L) 164 - 446 K/uL    MPV 11.2 9.0 - 12.9 fL    Neutrophils-Polys 82.80 (H) 44.00 - 72.00 %    Lymphocytes 7.10 (L) 22.00 - 41.00 %    Monocytes 8.10 0.00 - 13.40 %    Eosinophils 0.30 0.00 - 6.90 %    Basophils 0.30 0.00 - 1.80 %    Immature Granulocytes 1.40 (H) 0.00 - 0.90 %    Nucleated RBC 0.00 /100 WBC    Neutrophils (Absolute) 11.73 (H) 2.00 - 7.15 K/uL    Lymphs (Absolute) 1.01  1.00 - 4.80 K/uL    Monos (Absolute) 1.15 (H) 0.00 - 0.85 K/uL    Eos (Absolute) 0.04 0.00 - 0.51 K/uL    Baso (Absolute) 0.04 0.00 - 0.12 K/uL    Immature Granulocytes (abs) 0.20 (H) 0.00 - 0.11 K/uL    NRBC (Absolute) 0.00 K/uL   COMP METABOLIC PANEL    Collection Time: 10/27/22 10:41 AM   Result Value Ref Range    Sodium 131 (L) 135 - 145 mmol/L    Potassium 5.1 3.6 - 5.5 mmol/L    Chloride 105 96 - 112 mmol/L    Co2 17 (L) 20 - 33 mmol/L    Anion Gap 9.0 7.0 - 16.0    Glucose 99 65 - 99 mg/dL    Bun 39 (H) 8 - 22 mg/dL    Creatinine 2.04 (H) 0.50 - 1.40 mg/dL    Calcium 8.3 (L) 8.5 - 10.5 mg/dL    AST(SGOT) 48 (H) 12 - 45 U/L    ALT(SGPT) 37 2 - 50 U/L    Alkaline Phosphatase 87 30 - 99 U/L    Total Bilirubin 1.1 0.1 - 1.5 mg/dL    Albumin 2.4 (L) 3.2 - 4.9 g/dL    Total Protein 7.0 6.0 - 8.2 g/dL    Globulin 4.6 (H) 1.9 - 3.5 g/dL    A-G Ratio 0.5 g/dL   PROTHROMBIN TIME    Collection Time: 10/27/22 10:41 AM   Result Value Ref Range    PT 22.3 (H) 12.0 - 14.6 sec    INR 2.01 (H) 0.87 - 1.13   APTT    Collection Time: 10/27/22 10:41 AM   Result Value Ref Range    APTT 41.1 (H) 24.7 - 36.0 sec   COD (ADULT)    Collection Time: 10/27/22 10:41 AM   Result Value Ref Range    ABO Grouping Only A     Rh Grouping Only POS     Antibody Screen-Cod NEG    TROPONIN    Collection Time: 10/27/22 10:41 AM   Result Value Ref Range    Troponin T 29 (H) 6 - 19 ng/L   ESTIMATED GFR    Collection Time: 10/27/22 10:41 AM   Result Value Ref Range    GFR (CKD-EPI) 26 (A) >60 mL/min/1.73 m 2   PLATELETS REQUEST    Collection Time: 10/27/22 11:27 AM   Result Value Ref Range    Component P       P4                  Plts, Pheresis      [id number]   transfused   10/27/22   11:29      Product Type P4     Dispense Status transfused     Unit Number (Barcoded) [id number]     Product Code (Barcoded) [id number]     Blood Type (Barcoded) 5100    ABO Rh Confirm    Collection Time: 10/27/22 11:33 AM   Result Value Ref Range    ABO Rh Confirm A  POS    EKG    Collection Time: 10/27/22 12:31 PM   Result Value Ref Range    Report       [facility]    Test Date:  2022-10-27  Pt Name:    JOSE A SAUCEDO                     Department: [facility]  MRN:        [id number]                      Room:       R111  Gender:     Female                       Technician: ERIKA  :        [id number]                   Requested By:LINA ORELLANA  Order #:    [id number]                    Reading MD: Hao Sanon MD    Measurements  Intervals                                Axis  Rate:       103                          P:  NV:                                      QRS:        98  QRSD:       162                          T:          6  QT:         405  QTc:        530    Interpretive Statements  AFIB/FLUT AND V-PACED COMPLEXES  RIGHT BUNDLE BRANCH BLOCK  BASELINE WANDER IN LEAD(S) V1,V2  Electronically Signed On 10- 15:35:54 PDT by Hao Sanon MD     POCT arterial blood gas device results    Collection Time: 10/27/22  4:43 PM   Result Value Ref Range    Ph 7.258 (LL) 7.400 - 7.500    Pco2 46.7 (H) 26.0 - 37.0 mmHg    Po2 234 (H) 64 - 87 mmHg    Tco2 22 20 - 33 mmol/L    S02 100 (H) 93 - 99 %    Hco3 20.9 17.0 - 25.0 mmol/L    BE -6 (L) -4 - 3 mmol/L    Body Temp 36.6 C degrees    O2 Therapy 100 %    iPF Ratio 234     Ph Temp Shoaib 7.264 (LL) 7.400 - 7.500    Pco2 Temp Co 45.9 (H) 26.0 - 37.0 mmHg    Po2 Temp Cor 232 (H) 64 - 87 mmHg    Specimen Arterial    POCT lactate device results    Collection Time: 10/27/22  4:43 PM   Result Value Ref Range    iStat Lactate 0.7 0.5 - 2.0 mmol/L   Basic Metabolic Panel    Collection Time: 10/27/22  5:43 PM   Result Value Ref Range    Sodium 134 (L) 135 - 145 mmol/L    Potassium 4.6 3.6 - 5.5 mmol/L    Chloride 105 96 - 112 mmol/L    Co2 18 (L) 20 - 33 mmol/L    Glucose 182 (H) 65 - 99 mg/dL    Bun 38 (H) 8 - 22 mg/dL    Creatinine 1.89 (H) 0.50 - 1.40 mg/dL    Calcium 8.7 8.5 - 10.5 mg/dL    Anion Gap 11.0 7.0 - 16.0   ESTIMATED GFR     Collection Time: 10/27/22  5:43 PM   Result Value Ref Range    GFR (CKD-EPI) 28 (A) >60 mL/min/1.73 m 2   Basic Metabolic Panel    Collection Time: 10/27/22 11:32 PM   Result Value Ref Range    Sodium 143 135 - 145 mmol/L    Potassium 5.0 3.6 - 5.5 mmol/L    Chloride 118 (H) 96 - 112 mmol/L    Co2 16 (L) 20 - 33 mmol/L    Glucose 210 (H) 65 - 99 mg/dL    Bun 36 (H) 8 - 22 mg/dL    Creatinine 1.90 (H) 0.50 - 1.40 mg/dL    Calcium 7.5 (L) 8.5 - 10.5 mg/dL    Anion Gap 9.0 7.0 - 16.0   ESTIMATED GFR    Collection Time: 10/27/22 11:32 PM   Result Value Ref Range    GFR (CKD-EPI) 28 (A) >60 mL/min/1.73 m 2   POCT arterial blood gas device results    Collection Time: 10/28/22  1:06 AM   Result Value Ref Range    Ph 7.236 (LL) 7.400 - 7.500    Pco2 38.2 (H) 26.0 - 37.0 mmHg    Po2 63 (L) 64 - 87 mmHg    Tco2 17 (L) 20 - 33 mmol/L    S02 87 (L) 93 - 99 %    Hco3 16.2 (L) 17.0 - 25.0 mmol/L    BE -10 (L) -4 - 3 mmol/L    Body Temp 99.0 F degrees    O2 Therapy 50 %    iPF Ratio 126     Ph Temp Shoaib 7.233 (LL) 7.400 - 7.500    Pco2 Temp Co 38.5 (H) 26.0 - 37.0 mmHg    Po2 Temp Cor 64 64 - 87 mmHg    Specimen Arterial    POCT lactate device results    Collection Time: 10/28/22  1:06 AM   Result Value Ref Range    iStat Lactate 3.7 (H) 0.5 - 2.0 mmol/L   POCT arterial blood gas device results    Collection Time: 10/28/22  1:27 AM   Result Value Ref Range    Ph 7.213 (LL) 7.400 - 7.500    Pco2 38.9 (H) 26.0 - 37.0 mmHg    Po2 76 64 - 87 mmHg    Tco2 17 (L) 20 - 33 mmol/L    S02 92 (L) 93 - 99 %    Hco3 15.7 (L) 17.0 - 25.0 mmol/L    BE -11 (L) -4 - 3 mmol/L    Body Temp 99.0 F degrees    O2 Therapy 60 %    iPF Ratio 127     Ph Temp Shoaib 7.210 (LL) 7.400 - 7.500    Pco2 Temp Co 39.3 (H) 26.0 - 37.0 mmHg    Po2 Temp Cor 77 64 - 87 mmHg    Specimen Arterial    POCT sodium device results    Collection Time: 10/28/22  1:27 AM   Result Value Ref Range    Istat Sodium 147 (H) 135 - 145 mmol/L   POCT potassium device results     Collection Time: 10/28/22  1:27 AM   Result Value Ref Range    Istat Potassium 5.0 3.6 - 5.5 mmol/L   POCT ionized CA device results    Collection Time: 10/28/22  1:27 AM   Result Value Ref Range    Istat Ionized Calcium 1.17 1.10 - 1.30 mmol/L   POCT hematocrit and hemoglobin device results    Collection Time: 10/28/22  1:27 AM   Result Value Ref Range    Istat Hematocrit 25 (L) 37 - 47 %    Istat Hemoglobin 8.5 (L) 12.0 - 16.0 g/dL   LACTIC ACID    Collection Time: 10/28/22  2:40 AM   Result Value Ref Range    Lactic Acid 4.1 (HH) 0.5 - 2.0 mmol/L   POCT arterial blood gas device results    Collection Time: 10/28/22  4:00 AM   Result Value Ref Range    Ph 7.259 (LL) 7.400 - 7.500    Pco2 35.2 26.0 - 37.0 mmHg    Po2 97 (H) 64 - 87 mmHg    Tco2 17 (L) 20 - 33 mmol/L    S02 96 93 - 99 %    Hco3 15.8 (L) 17.0 - 25.0 mmol/L    BE -10 (L) -4 - 3 mmol/L    Body Temp 99.0 F degrees    O2 Therapy 60 %    iPF Ratio 162     Ph Temp Shoaib 7.256 (LL) 7.400 - 7.500    Pco2 Temp Co 35.5 26.0 - 37.0 mmHg    Po2 Temp Cor 99 (H) 64 - 87 mmHg    Specimen Arterial    POCT lactate device results    Collection Time: 10/28/22  4:00 AM   Result Value Ref Range    iStat Lactate 4.1 (HH) 0.5 - 2.0 mmol/L   Basic Metabolic Panel    Collection Time: 10/28/22  5:13 AM   Result Value Ref Range    Sodium 150 (H) 135 - 145 mmol/L    Potassium 4.9 3.6 - 5.5 mmol/L    Chloride 120 (H) 96 - 112 mmol/L    Co2 15 (L) 20 - 33 mmol/L    Glucose 155 (H) 65 - 99 mg/dL    Bun 39 (H) 8 - 22 mg/dL    Creatinine 2.50 (H) 0.50 - 1.40 mg/dL    Calcium 8.3 (L) 8.5 - 10.5 mg/dL    Anion Gap 15.0 7.0 - 16.0   Lipid Panel    Collection Time: 10/28/22  5:13 AM   Result Value Ref Range    Cholesterol,Tot 64 (L) 100 - 199 mg/dL    Triglycerides 94 0 - 149 mg/dL    HDL 25 (A) >=40 mg/dL    LDL 20 <100 mg/dL   CBC WITH DIFFERENTIAL    Collection Time: 10/28/22  5:13 AM   Result Value Ref Range    WBC 40.6 (H) 4.8 - 10.8 K/uL    RBC 2.71 (L) 4.20 - 5.40 M/uL     Hemoglobin 8.1 (L) 12.0 - 16.0 g/dL    Hematocrit 26.2 (L) 37.0 - 47.0 %    MCV 96.7 81.4 - 97.8 fL    MCH 29.9 27.0 - 33.0 pg    MCHC 30.9 (L) 33.6 - 35.0 g/dL    RDW 53.3 (H) 35.9 - 50.0 fL    Platelet Count 76 (L) 164 - 446 K/uL    MPV 11.4 9.0 - 12.9 fL    Neutrophils-Polys 94.80 (H) 44.00 - 72.00 %    Lymphocytes 0.00 (L) 22.00 - 41.00 %    Monocytes 4.30 0.00 - 13.40 %    Eosinophils 0.00 0.00 - 6.90 %    Basophils 0.00 0.00 - 1.80 %    Nucleated RBC 0.00 /100 WBC    Neutrophils (Absolute) 38.49 (H) 2.00 - 7.15 K/uL    Lymphs (Absolute) 0.00 (L) 1.00 - 4.80 K/uL    Monos (Absolute) 1.75 (H) 0.00 - 0.85 K/uL    Eos (Absolute) 0.00 0.00 - 0.51 K/uL    Baso (Absolute) 0.00 0.00 - 0.12 K/uL    NRBC (Absolute) 0.00 K/uL   MAGNESIUM    Collection Time: 10/28/22  5:13 AM   Result Value Ref Range    Magnesium 2.1 1.5 - 2.5 mg/dL   PHOSPHORUS    Collection Time: 10/28/22  5:13 AM   Result Value Ref Range    Phosphorus 6.9 (H) 2.5 - 4.5 mg/dL   TSH    Collection Time: 10/28/22  5:13 AM   Result Value Ref Range    TSH 2.830 0.380 - 5.330 uIU/mL   FREE THYROXINE    Collection Time: 10/28/22  5:13 AM   Result Value Ref Range    Free T-4 1.71 (H) 0.93 - 1.70 ng/dL   BASIC TEG    Collection Time: 10/28/22  5:13 AM   Result Value Ref Range    Reaction Time Initial-R 11.2 (H) 4.6 - 9.1 min    React Time Initial Hep 9.4 (H) 4.3 - 8.3 min    Clot Kinetics-K 1.0 0.8 - 2.1 min    Clot Angle-Angle 75.5 63.0 - 78.0 degrees    Maximum Clot Strength-MA 64.4 52.0 - 69.0 mm    TEG Functional Fibrinogen(MA) 45.7 (H) 15.0 - 32.0 mm    Lysis 30 minutes-LY30 0.0 0.0 - 2.6 %    TEG Algorithm Link Algorithm    LACTIC ACID    Collection Time: 10/28/22  5:13 AM   Result Value Ref Range    Lactic Acid 5.7 (HH) 0.5 - 2.0 mmol/L   ESTIMATED GFR    Collection Time: 10/28/22  5:13 AM   Result Value Ref Range    GFR (CKD-EPI) 20 (A) >60 mL/min/1.73 m 2   DIFFERENTIAL MANUAL    Collection Time: 10/28/22  5:13 AM   Result Value Ref Range     Metamyelocytes 0.90 %    Manual Diff Status PERFORMED    PERIPHERAL SMEAR REVIEW    Collection Time: 10/28/22  5:13 AM   Result Value Ref Range    Peripheral Smear Review see below    PLATELET ESTIMATE    Collection Time: 10/28/22  5:13 AM   Result Value Ref Range    Plt Estimation Decreased    MORPHOLOGY    Collection Time: 10/28/22  5:13 AM   Result Value Ref Range    RBC Morphology Present     Toxic Gran Slight        Labs reviewed by me.       Imaging reviewed by me:     CT-HEAD W/O   Final Result         1.  Residual or recurrent 3.8 x 3.9 cm parenchymal hemorrhage within the surgical bed.   2.  2.4 cm hyperdense extra-axial hemorrhage along the craniectomy bed.   3.  Effacement of the bilateral ventricles with 1.6 cm right-to-left midline shift, increased since prior study.   4.  Right frontal, parietal, and temporal subarachnoid hemorrhages   5.  Bilateral intraventricular hemorrhages, increased since prior study.   6.  New pneumocephalus related to craniectomy.   7.  Atherosclerosis   8.  Left maxillary sinusitis changes.      These findings were discussed with the patient's clinician, Dr. Moore, on 10/28/2022 4:57 AM.      DX-CHEST-PORTABLE (1 VIEW)   Final Result         1.  Hazy left pulmonary infiltrates.   2.  Small left pleural effusion   3.  Cardiomegaly   4.  Atherosclerosis      CT-HEAD W/O   Final Result      1.  Right frontotemporal intra-axial hematoma measuring 7.6 x 5.3 cm and showing mild enlargement      2.  Increase in mass effect with right hemispheric edema, 7 mm of right-to-left shift, and subfalcine herniation as well as effacement of the basilar cisterns      3.  Additionally, intraventricular hemorrhage and right frontotemporal subarachnoid hemorrhage         DX-CHEST-FOR LINE PLACEMENT Perform procedure in: Patient's Room   Final Result      1.  Left IJ present without evidence of complication.      2.  Endotracheal tube present.      3.  Cardiomegaly.      4.  Volume loss  involving the left lung.      CT-HEAD W/O   Final Result      Slightly larger right temporal and deep cerebral hematoma and mildly increased localized mass effect.      There is intraventricular hemorrhage now seen.               DX-CHEST-PORTABLE (1 VIEW)   Final Result      1.  Cardiomegaly with persistent findings of pulmonary edema.   2.  Streaky bibasilar opacities, likely atelectasis.   3.  ET tube with tip projecting between the thoracic inlet and the ty.      CT-HEAD W/O   Final Result      Interval development of a right temporal and deep cerebral 5.4 x 4.9 x 3.5 cm hematoma.      No other significant change.         CT-HEAD W/O   Final Result      1.  Diffuse right-sided subarachnoid hemorrhage/contrast extravasation tracking into the prepontine cistern, greatest adjacent to the right M1.   2.  Possible layering hemorrhage along the falx and bilateral tentorium, though difficult to evaluate given recent contrast administration.   3.  Scattered, bilateral hyperdense foci greater on the right may represent sequela of recent contrast administration or subarachnoid hemorrhage.   4.  Small area of hypodensity within the right thalamus could represent parenchymal hemorrhage or sequela of recent contrast administration.      Neurointerventional radiology service is aware of these findings.      IR-THROMBO MECHANICAL ARTERY,INIT   Final Result      1.  Right M2 occlusion.   2.  Mechanical thrombectomy.   3.  TICI 2b flow.   4.  The procedure was complicated by transient microperforation. The final angiogram does not demonstrate any active contrast extravasation. The patient vital signs remains stable. The immediate CT scan demonstrates focal subarachnoid hemorrhages without    large parenchymal hemorrhage or midline shift.   5.  These findings are discussed with the neurologist and neurointensivists.      CT-CTA NECK WITH & W/O-POST PROCESSING   Final Result      1.  No acute arterial abnormality of the neck.    2.  Severe atherosclerotic narrowing of the distal bilateral vertebral arteries.   3.  Dilated pulmonary arteries, suggestive of pulmonary hypertension.   4.  3 mm left apical nodule. Follow-up recommendations below.      Low Risk: No routine follow-up      High Risk: Optional CT at 12 months      Comments: Nodules less than 6 mm do not require routine follow-up, but certain patients at high risk with suspicious nodule morphology, upper lobe location, or both may warrant 12-month follow-up.      Low Risk - Minimal or absent history of smoking and of other known risk factors.      High Risk - History of smoking or of other known risk factors.      Note: These recommendations do not apply to lung cancer screening, patients with immunosuppression, or patients with known primary cancer.      Fleischner Society 2017 Guidelines for Management of Incidentally Detected Pulmonary Nodules in Adults         INTERPRETING LOCATION: [address] [facility], [facility], [address]      CT-CTA HEAD WITH & W/O-POST PROCESS   Final Result      1.  Short segment occlusion within proximal M2 branch of the right middle cerebral artery and a possible additional mid M2 segmental occlusion.   2.  Significant distal bilateral vertebral artery calcified plaque with likely a severe right vertebral stenosis.   These findings were discussed with SAMANTHA AGEE on 10/27/2022 10:00 AM.      INTERPRETING LOCATION: [address] [facility], [place], [address]      CT-CEREBRAL PERFUSION ANALYSIS   Final Result      Patient moved during the exam and perfusion analysis could not be performed. Exam is nondiagnostic.      CT-HEAD W/O   Final Result      No acute intracranial abnormality.         INTERPRETING LOCATION: [address] , [place], [address]      DX-CHEST-PORTABLE (1 VIEW)   Final Result      1.  Cardiomegaly with mild interstitial thickening suggestive of pulmonary edema.         INTERPRETING LOCATION: [address] , [facility], [address]      EC-ECHOCARDIOGRAM COMPLETE W/O CONT     (Results Pending)   IR-CONSULT AND TREAT    (Results Pending)   MR-BRAIN-W/O    (Results Pending)         Presumed mechanism by TOAST:  __Large Artery Atherosclerosis  __Small Vessel (Lacunar)  _X_Cardioembolic  __Other (Sickle Cell, Vasculitis, Hypercoagulable)  __Unknown    Modified Stafford Scale (MRS): 0 = No symptoms      ASSESSMENT AND PLAN:  67-year old female with PMHX significant for Afib on Eliquis, AICD/pacemaker, dyslipidemia, hypothyroid, hypertension, obesity, AL who presented to [facility] on 10/27/22 for a chief complaint of Left sided weakness/ neglect and dysarthria; NIHSS 7 on arrival. Here, Stat CT head revealed no acute intracranial abnormality. CTA head/neck however revealed Right MCA M2 thrombus. Patient was determined not to be a candidate for IV thrombolytic tx secondary to concurrent use of Eliquis-- last dose evening 10/26.    Patient s/p IR thrombectomy Right M2 thrombus with TICI 2b; course complicated by micro perforation of proximal M2/M1 region, with Right SAH noted immediately following the procedure. Patient received Kcentra, platelets in reversal of eliquis. Shortly thereafter, patient had declining exam, required intubation; additional imaging revealed large Right frontal/temporal IPH with mass effect/midline shift. The patient went back to IR given concern for active extravasation of Right M2-- no obvious active bleeding from M2, likely subtle extravasation from capillary vessel(s). She ultimately underwent Right hemicraniotomy/hemicraniectomy last night given concern for impending herniation. Additionally, the patient has profound metabolic disarray and leukocytosis today, being managed by ICU team. Plan for continued supportive care and close ICU surveillance.     Additional Recommendations/Plan:     -q2h and PRN neuro assessment. VS per nursing/unit protocol (at least q1h). Antihypertensives per ICU team. SBP goal 100-1400-- note, give the amount of hemorrhage and  subarachnoid blood, there is concern for vasospasm in that region; feel the potential risk of permissive hypertension does not necessarily outweigh benefit, thus would recommend to continue -140.   -No anticoagulation/antithrombotics.   -Telemetry; currently in Afib. No need for TTE.   -Hypertonic 3% saline infusion for goal Na 150-155; current Na is 150. Q6h Na checks.   -Keppra 500 mg IV q12h per neurosurgery.  -Obtain MRI Brain wo contrast incoming days; not urgent at this point.   -Will follow closely. All other medical management per ICU team.   -DVT PPX: SCDs.     The plan of care above has been discussed with Dr. Tanvir Pan. Please call with questions.    Zainab Henning, MIKEY.P.R.N.  [employer] [employer]

## 2022-10-28 NOTE — DIETARY
"Nutrition Support Assessment   Day 1 of admit.  Radha Gomez is a 67 y.o. female with admitting DX of Acute ischemic stroke.     Current problem list:  Acute ischemic stroke  AF  S/p Mitral valve replacement  Primary HTN  Dyslipidemia  Hypothyroidism  CAD     Assessment:  Estimated Nutritional Needs: based on:   Height: 162.6 cm (5' 4.02\")  Weight: 90.9 kg (200 lb 6.4 oz) - bed scale  Weight to Use in Calculations: 90.9 kg (200 lb 6.4 oz)  Body mass index is 34.38 kg/m²., BMI classification: Obesity class I  Ideal body weight: 120 lbs (54.5 kg)    Calculation/Equation: REE per MSJ x 1.0 = 1421 kcals  RMR per PSU (VE: 10.9 L/min and Tmax: 37.9 C)  = 1842 kcals (65-70% = 1197 - 1289 kcals/day)  Total Calories / day: 1000 - 1300 (Calories / k - 14)  Total Grams Protein / day: 82 - 109 (Grams Protein / k.5 - 2.0 of IBW, 0.9 - 1.2 gm/kg)     Evaluation:   Consult received for TF.   OG tube in place and confirmed on KUB at the GE junction.  Pt is s/p R decompressive hemicraniectomy, R intraparenchymal extraction of hematoma, and R temporal lobectomy.   Palliative care c/s pending.   Skin: incision head R, no edema noted.  Labs: Na 150, glucose 155, BUN 39, Creat 2.50, GFR 20, Ca 8.3, Phos 6.9, A1C 5.5, HDL 25  Meds: pepcid, solucortef, keppra, colace, bowel protocol, 3% NaCl IV, Levo at 27 mcg/min, Phenylephrine at 40 mcg/min, Vaso at 0.03 units/hr  Last BM: PTA  [facility] obesity guidelines used for estimated needs.  Low CHO high protein formula indicated, lower protein for elevated renal labs.     Malnutrition Risk: N/A     Recommendations/Plan:  Start TF Impact Peptide 1.5 at 10 ml/hr and advance once okay to advance per MD. Final goal rate is 35 ml/hr to provide 1260 kcals, 79 gm protein, and 647 ml free water per day.  Fluids per MD.  Monitor pressor infusion rates.  Will order metabolic cart study and await results.    RD following.              "

## 2022-10-28 NOTE — PROGRESS NOTES
Updates:    Patient has progressively declined throughout the night. R pupil remains fixed and asymmetric. Pressor requirements climbing. Tachycardic. Noted to have elevated WBC and borderline fevers. No clear infectious source, but choice made to start broad spectrum abx. Stress dose steroids also added. Intermittent bolus. Calcium chloride and bicarb pushes. Additionally, I have ordered the repeat head CT to be performed sooner than the original 5:30 time. Prognosis remains extremely guarded/poor.     ICU time: 40 minutes    Riley Moore DO  Staff Pulmonologist and Intensivist  [facility]     CT result reviewed. Worsening right-to-left midline shift, increased bilateral intraventricular hemorrhages. Plan: increased hypertonic saline rate to 40 cc/hr. STAT TEG ordered. Neurosurgery paged.

## 2022-10-28 NOTE — PROGRESS NOTES
Dr. Reddy updated. Patient blood pressure in the 70's with max vasopressor therapy MAP of 60. Left pupil now a 7 irregular and fixed. Unable to assess right. Loss of cough.

## 2022-10-28 NOTE — CONSULTS
"[facility] -  CONSULTATION NOTE               Author: Chin Kaufman M.D. Date & Time: 10/28/2022  2:10 PM       REASON FOR CONSULTATION:   - REGINALD    CHIEF COMPLAINT:   -  \" Acute CVA \"    HISTORY OF PRESENT ILLNESS:    This is a 67-year old female with PMHX significant for Afib on Eliquis, AICD/pacemaker, dyslipidemia, hypothyroid, hypertension, obesity, AL who presented to [facility] on 10/27/22 for a chief complaint of Left sided weakness. Pt was taken to IR for clot retrieval and had a microperforation. Pt decompensated and require emergent craniotmy 10/27. Pt has further decompensated and is now maxed out on 3 pressors.  During the same period, pt has developed worsening REGINALD, acidmei aand hyperkalemia.    REVIEW OF SYSTEMS:    Unable second to acute illness    PAST MEDICAL HISTORY:   - CVA  - AFib  -HTN    PAST SURGICAL HISTORY:   - craniootmy    FAMILY HISTORY:   - unable second to acute illness    SOCIAL HISTORY:   Unable second ot acute illness    HOME MEDICATIONS:   - Reviewed and documented in chart    LABORATORY STUDIES:   - Reviewed and documented in chart    ALLERGIES:  Patient has no known allergies.    VS:  /70   Pulse (!) 114   Temp (!) 35.8 °C (96.4 °F) (Temporal)   Resp (!) 34   Ht 1.626 m (5' 4.02\")   Wt 90.9 kg (200 lb 6.4 oz)   SpO2 97%   Breastfeeding No   BMI 34.38 kg/m²   Physical Exam  Vitals and nursing note reviewed.   Constitutional:       Comments: sedated   HENT:      Head:      Comments: Large incsion without surrounding erythema right side scalp     Mouth/Throat:      Comments: ETT in place  Cardiovascular:      Rate and Rhythm: Bradycardia present.   Pulmonary:      Effort: Pulmonary effort is normal. No respiratory distress.   Abdominal:      General: There is no distension.   Musculoskeletal:         General: Swelling present.   Skin:     Coloration: Skin is not jaundiced.   Neurological:      Comments: No response       FLUID " BALANCE:  In: 4508.6 [I.V.:4166.9; Blood:33.3]  Out: 1780     LABS:  Recent Labs     10/27/22  2332 10/28/22  0513 10/28/22  1230   SODIUM 143 150* 150*   POTASSIUM 5.0 4.9 6.6*   CHLORIDE 118* 120* 120*   CO2 16* 15* 8*   GLUCOSE 210* 155* 143*   BUN 36* 39* 41*   CREATININE 1.90* 2.50* 2.85*   CALCIUM 7.5* 8.3* 7.9*        IMAGING:  - Imaging studies reviewed by me    IMPRESSION:  66 y/o female s/p ICH now has multiple electrolyte derrangement  #REGINALD multifactorial from contrast/decreased perfusion  #Hyperkalemias second to renal failure/acidemia  #Acidemia second to tissue underperfusion with elevated lactate  #ICH s/p craniotmu  #Resp Failure VEnt    PLAN:  - Pt is rapidly decling unfortuenalty, I do not think any form of renal repalcement therapy would reverse her course. Recomeneded calcium/ bicarboante  to try and prolong life in order for family to see.

## 2022-10-28 NOTE — ANESTHESIA TIME REPORT
Anesthesia Start and Stop Event Times     Date Time Event    10/27/2022 1916 Ready for Procedure     1931 Anesthesia Start     2238 Anesthesia Stop        Responsible Staff  10/27/22    Name Role Begin End    Kevin Goldstein M.D. Anesth 1931 2238        Overtime Reason:  no overtime (within assigned shift)    Comments: JASON 2nd CALL 19:31-20:55 IR CASE IN IR, then transport to OR #5 for CRANI 20:59-22:38

## 2022-10-28 NOTE — HOSPITAL COURSE
Ms. Gomez is a 67 year old lady with the past medical history of atrial fibrillation/flutter s/p ablation on Eliquis, mitral valve replacement s/p MVR with porcine valve, aortic aneurysm repair, left atrial appendage ligation with Maze procedure in 2016, permanent pacemaker, hypertension, dyslipidemia, CAD s/p bare metal stent to the LAD, and hypothyroidism who was admitted on 10/27 after she was found to have a right M2 occlusion after having sudden onset of left-sided weakness and inability to walk.  The patient was not a candidate for thrombolytics given that she was on Eliquis.  She was emergently taken to IR for thrombectomy.  Thrombectomy was achieved with a TICI 2b resultant flow; however, it was complicated by a microperforation to the MCA with subsequent SAH/IPH.  Unfortunately, the patient deteriorated over the course of several hours requiring emergent intubation with both central and arterial line placement. She was started on hypertonic saline.  Her repeat CT head revealed enlarging hematoma with right hemispheric edema, 7mm of right to left shift, and subfalcine herniation.  Neurosurgery was consulted and the patient underwent emergent right decompressive hemicraniectomy with right temporal lobectomy and extraction of the clot.

## 2022-10-28 NOTE — PROGRESS NOTES
Called spouse Isaac Gomez updated on patient current status. Dr. Reddy spoke with him to discuss plan of care and patient current status.

## 2022-10-28 NOTE — DISCHARGE PLANNING
Pamella Garcia has [employer] for her insurance provider.  Unfortunately, [facility] is not a benefit.  [facility] will no longer follow.  Please reach out to myself if a consult is needed to contribute to POC once liberated from the vent and TX evals are available.

## 2022-10-28 NOTE — ANESTHESIA PREPROCEDURE EVALUATION
Date/Time: 10/27/22 6412    Scheduled providers: Chin Velazquez M.D.    Procedure: IR-CONSULT AND TREAT    Location: [facility] IM[facility]ING - INTERVENTIONAL - [facility]          Relevant Problems   NEURO   (positive) Acute ischemic stroke (HCC)      CARDIAC   (positive) AF (atrial fibrillation) (HCC)   (positive) CAD (coronary artery disease)   (positive) Primary hypertension      ENDO   (positive) Hypothyroidism       Physical Exam    Airway   Mallampati: II  TM distance: >3 FB  Neck ROM: full       Cardiovascular - normal exam  Rhythm: regular  Rate: normal  (-) murmur     Dental - normal exam           Pulmonary - normal exam  Breath sounds clear to auscultation     Abdominal    Neurological - normal exam                 Anesthesia Plan    ASA 4- EMERGENT   ASA physical status 4 criteria: CVA or TIA - recent (< 3 months)ASA physical status emergent criteria: acute hemorrhage    Plan - general       Airway plan will be ETT    (Patient Intubated and sedated....)      Induction: intravenous    Postoperative Plan: Postoperative administration of opioids is intended.    Pertinent diagnostic labs and testing reviewed    Informed Consent:    Anesthetic plan and risks discussed with patient.    Use of blood products discussed with: patient whom consented to blood products.

## 2022-10-28 NOTE — PROGRESS NOTES
Pt presents to IR3. Pt was emergently  consented by MD, confirmed by this RN and consent at bedside. Pt transferred to ir table in supine position. Patient underwent a cerebral angiogram by Dr. Swann. Procedure site was marked by MD and verified using imaging guidance. Pt placed on monitor, prepped and draped in a sterile fashion. Vitals were taken every 5 minutes and remained stable during procedure (see doc flow sheet for results). CO2 waveform capnography was monitored and remained WNL throughout procedure. Report called to RICU RN. Pt transported by bina with RN to OR5 with dr quintero     Pt was taken to the OR.     [facility]   REF: [id number]  LOT: [id number]

## 2022-10-28 NOTE — ASSESSMENT & PLAN NOTE
Due to microperforation of right M2   S/p Kcentra and platelets  CTA without obvious vessel to embolize  Worsening hemorrhage with edema and midline shift  Neurosurgery consulted-->s/p emergent right hemicrani with right temporal lobectomy  3% infusion, titrating for Na 150-160  Keppra 500mg IV BID

## 2022-10-28 NOTE — PROGRESS NOTES
Neurosurgery Progress Note    Subjective:  Patient taking urgently to the OR last night for large ICH sp IR procedure.  Did poorly post op and CT shows still massive hemorrhage along sylvian fissure and in the temporal lobe resection      Exam:  No response to verbal or to noxious.   Right pupil fixed  Left 4 mm and non reactive  No corneal  No gag   No with draw to noxious.       BP  Min: 80/56  Max: 160/81  Pulse  Av  Min: 32  Max: 138  Resp  Av.3  Min: 20  Max: 71  Temp  Av.1 °C (97 °F)  Min: 35.8 °C (96.4 °F)  Max: 36.6 °C (97.9 °F)  Monitored Temp 2  Av °C (98.6 °F)  Min: 36 °C (96.8 °F)  Max: 37.9 °C (100.2 °F)  SpO2  Av.2 %  Min: 77 %  Max: 100 %    No data recorded    Recent Labs     10/27/22  1041 10/28/22  0513   WBC 14.2* 40.6*   RBC 3.57* 2.71*   HEMOGLOBIN 10.7* 8.1*   HEMATOCRIT 32.9* 26.2*   MCV 92.2 96.7   MCH 30.0 29.9   MCHC 32.5* 30.9*   RDW 48.6 53.3*   PLATELETCT 82* 76*   MPV 11.2 11.4     Recent Labs     10/27/22  1743 10/27/22  2332 10/28/22  0513   SODIUM 134* 143 150*   POTASSIUM 4.6 5.0 4.9   CHLORIDE 105 118* 120*   CO2 18* 16* 15*   GLUCOSE 182* 210* 155*   BUN 38* 36* 39*   CREATININE 1.89* 1.90* 2.50*   CALCIUM 8.7 7.5* 8.3*     Recent Labs     10/27/22  1041   APTT 41.1*   INR 2.01*     Recent Labs     10/28/22  0513   REACTMIN 11.2*   CLOTKINET 1.0   CLOTANGL 75.5   MAXCLOTS 64.4   [id number] 0.0       Intake/Output                         10/27/22 0700 - 10/28/22 0659 10/28/22 0700 - 10/29/22 0659     4109-0476 7445-1992 Total 1440-6173 3412-4635 Total                 Intake    I.V.  604.7  3562.3 4166.9  391.8  -- 391.8    Cardene Volume -- -- -- 0 -- 0    Vasopressin Volume -- 28.1 28.1 18 -- 18    Phenylephrine Volume -- 36.7 36.7 6.9 -- 6.9    Propofol Volume 11.4 15.8 27.1 0.7 -- 0.7    Norepinephrine Volume -- 294.6 294.6 105.1 -- 105.1    Volume (mL) (NS (BOLUS) infusion 250 mL) 250 -- 250 -- -- --    Volume (mL) (NS infusion) 343.3 1200 1543.3 200 --  200    Volume (mL) (3% sodium chloride (HYPERTONIC SALINE) 500mL infusion 500 mL) 0 197.3 197.3 0 -- 0    Volume (mL) (3% sodium chloride (HYPERTONIC SALINE) 500mL infusion 250 mL) 0 -- 0 -- -- --    Volume (mL) (NS infusion) -- 1000 1000 -- -- --    Volume (mL) (lactated ringers infusion (BOLUS)) -- 266.4 266.4 -- -- --    Volume (mL) (NS (BOLUS) infusion 500 mL) -- 499.5 499.5 0 -- 0    Volume (mL) (3% sodium chloride (HYPERTONIC SALINE) 500mL infusion 500 mL) -- 24 24 61.2 -- 61.2    Blood  33.3  -- 33.3  --  -- --    Volume (RELEASE PLATELET PHERESIS) 33.3 -- 33.3 -- -- --    IV Piggyback  13.3  295 308.3  105.4  -- 105.4    Volume (mL) (sodium chloride 200 mEq in empty bag 50 mL ivpb) 13.3 -- 13.3 -- -- --    Volume (mL) (sodium chloride 200 mEq in empty bag 50 mL ivpb) 0 -- 0 -- -- --    Volume (mL) (piperacillin-tazobactam (Zosyn) 3.375 g in  mL IVPB) -- 100 100 0 -- 0    Volume (mL) (piperacillin-tazobactam (Zosyn) 3.375 g in  mL IVPB) -- -- -- 0.4 -- 0.4    Volume (mL) (vancomycin (VANCOCIN) 2,250 mg in  mL IVPB) -- -- -- 0 -- 0    Volume (mL) (Linezolid (ZYVOX) premix 600 mg) -- 195 195 105 -- 105    Total Intake 651.3 3857.3 4508.6 497.3 -- 497.3       Output    Urine  900  600 1500  40  -- 40    Urine -- 400 400 -- -- --    Urine Void (mL) 100 -- 100 -- -- --    Output (mL) (Urethral Catheter)  40 -- 40    Drains  --  30 30  --  -- --    Output (mL) (Closed/Suction Drain 1 Right Scalp Hemovac 10 Fr.) -- 30 30 -- -- --    Blood  --  250 250  --  -- --    Est. Blood Loss -- 250 250 -- -- --    Total Output  40 -- 40       Net I/O     -248.7 2977.3 2728.6 457.3 -- 457.3              Intake/Output Summary (Last 24 hours) at 10/28/2022 1012  Last data filed at 10/28/2022 0800  Gross per 24 hour   Intake 5005.84 ml   Output 1820 ml   Net 3185.84 ml             norepinephrine (Levophed) infusion  0-30 mcg/min Continuous    vasopressin (PITRESSIN) infusion  0.03  Units/min Continuous    hydrocortisone sodium succinate PF  100 mg Q8HRS    piperacillin-tazobactam  3.375 g Q8HRS    phenylephrine infusion  0-300 mcg/min Continuous    linezolid (ZYVOX) IV  600 mg Q12HRS    3% sodium chloride  500 mL Continuous    levETIRAcetam (Keppra) IV  500 mg Q12HRS    Respiratory Therapy Consult   Continuous RT    ondansetron  4 mg Q4HRS PRN    ondansetron  4 mg Q4HRS PRN    levothyroxine  100 mcg AM ES    montelukast  10 mg Nightly    amiodarone  200 mg DAILY    hydrALAZINE  10 mg Q2HRS PRN    labetalol  10 mg Q10 MIN PRN    niCARdipine infusion  0-15 mg/hr Continuous    NS   Continuous    famotidine  20 mg Q24HRS    Or    famotidine  20 mg Q24HRS    senna-docusate  2 Tablet BID    And    polyethylene glycol/lytes  1 Packet QDAY PRN    And    magnesium hydroxide  30 mL QDAY PRN    And    bisacodyl  10 mg QDAY PRN    lidocaine  2 mL Q30 MIN PRN    fentaNYL  100 mcg Q15 MIN PRN    And    fentaNYL  200 mcg Q15 MIN PRN    And    fentaNYL   Continuous    And    propofol  0-80 mcg/kg/min Continuous    ipratropium-albuterol  3 mL Q2HRS PRN (RT)    23.4% HYPERtonic saline IVPB  200 mEq Q6HRS PRN    23.4% HYPERtonic saline IVPB  200 mEq Once       Assessment and Plan:  Hospital day #1  POD #1  Prophylactic anticoagulation: no         Start date/time: undetermined.     Patient currently moribund. CT consistent with uncal herniation.   I do not think any further intervention will save this patient.

## 2022-10-28 NOTE — THERAPY
Missed Therapy     Patient Name: Radha Gomez  Age:  67 y.o., Sex:  female  Medical Record #: [id number]  Today's Date: 10/28/2022       10/28/22 1132   Initial Contact Note    Initial Contact Note Order Received and Verified. Occupational Therapy Evaluation NOT Completed Because Patient Does Not Require Acute Occupational Therapy at this Time.   Interdisciplinary Plan of Care Collaboration   Collaboration Comments OT order received/acknowedged and to be cancelled. Discussed w/RN pt not medically stable/appropriate w/changes in medical status since order was placed. DC OT   Session Information   Date / Session Number  10/28 no glennaal

## 2022-10-28 NOTE — ASSESSMENT & PLAN NOTE
?due to neurogenic shock vs septic  Sepsis protocols ongoing  Following cultures  MAP goals > 65 with vasopressors  Active titration of levophed, vasopressin, phenylephrine  Stress dose steroids  Zosyn/Zovox  Monitor UOP/lactate

## 2022-10-28 NOTE — OR SURGEON
Immediate Post- Operative Note        Findings: Possible right MCA extravastation      Procedure(s): diagnostic angiogram    Questionable subtle extravasation from the capillary vessal.       Estimated Blood Loss: Less than 5 ml        Complications: None            10/27/2022     8:53 PM     Johny Joyner M.D.

## 2022-10-28 NOTE — PROGRESS NOTES
5481 - Patient back from OR. GCS 3. Not withdrawing from pain. Unequal pupils. Right pupil 6mm irregular, Left pupil 2mm round, both nonreactive. Tachycardic 120s. Notified Dr Rankin.     0028 - Notified Nkechi LEEN of hypotension. SBP 90s. LR 250cc bolus ordered.     0054 - Bolus complete, SBP 80s/50s. Levophed ordered    0230 - Maxed on levo. Received orders for Vaso    0255 - Notified Dr Moore. Received orders for vasopressor, steroids, abx, bicarb, and calcium (see MAR) and to get head CT now instead of scheduled time 0530.     5215 - Dr Moore at bedside. Preparing patient for CT - pads on and zoll attached. Awaiting pharmacy to send up Phenylephrine gtt.     8732 - Head CT complete, patient tolerated well. Titrating vasopressors for -140

## 2022-10-29 NOTE — PROGRESS NOTES
Patient  at 1611. 2 RN's pronounced. Family was at bedside. Dr. Reddy and Dr. Pan were updated. Family took all belongings except dentures which were placed in patient's mouth.  and Donor network were updated.

## 2022-10-29 NOTE — DISCHARGE SUMMARY
Death Summary    Cause of Death  Acute hypoxic respiratory failure due to uncal herniation due to massive intracranial hemorrhage due to microperforation of the right middle cerebral artery.    Comorbid Conditions at the Time of Death  Principal Problem:    Acute ischemic stroke (HCC) POA: Yes  Active Problems:    Intracranial hemorrhage (HCC) POA: Yes    Shock (HCC) POA: No    REGINALD (acute kidney injury) (HCC) POA: Yes    Lactic acidosis POA: Yes    AF (atrial fibrillation) (HCC) POA: Unknown    S/P MVR (mitral valve replacement) POA: Unknown    Primary hypertension POA: Unknown    Dyslipidemia POA: Unknown    Hypothyroidism POA: Unknown    CAD (coronary artery disease) POA: Unknown  Resolved Problems:    * No resolved hospital problems. *      History of Presenting Illness and Hospital Course  Ms. Gomez is a 67 year old lady with the past medical history of atrial fibrillation/flutter s/p ablation on Eliquis, mitral valve replacement s/p MVR with porcine valve, aortic aneurysm repair, left atrial appendage ligation with Maze procedure in 2016, permanent pacemaker, hypertension, dyslipidemia, CAD s/p bare metal stent to the LAD, and hypothyroidism who was admitted on 10/27 after she was found to have a right M2 occlusion after having sudden onset of left-sided weakness and inability to walk.  The patient was not a candidate for thrombolytics given that she was on Eliquis.  She was emergently taken to IR for thrombectomy.  Thrombectomy was achieved with a TICI 2b resultant flow; however, it was complicated by a microperforation to the MCA with subsequent SAH/IPH.  Unfortunately, the patient deteriorated over the course of several hours requiring emergent intubation with both central and arterial line placement. She was started on hypertonic saline.  Her repeat CT head revealed enlarging hematoma with right hemispheric edema, 7mm of right to left shift, and subfalcine herniation.  Neurosurgery was consulted and the  patient underwent emergent right decompressive hemicraniectomy with right temporal lobectomy and extraction of the clot.  The patient returned to the ICU in shock requiring multiple vasopressors.  Unfortunately, the patient continued to go into multiorgan failure despite all interventions.  Family was updated and came to bedside.  After a detailed conversation with a daughter and the patient's , the family chose to transition to comfort focused care with compassionate extubation.  The patient passed away with her  and daughter at bedside.    Death Date: 10/28/22   Death Time: 1611         Pronounced By (RN1): Brittany Chang  Pronounced By (RN2): Cem Hung

## (undated) DEVICE — SUTURE 2-0 VICRYL PLUS CT-1 - 8 X 18 INCH(12/BX)

## (undated) DEVICE — SET LEADWIRE 5 LEAD BEDSIDE DISPOSABLE ECG (1SET OF 5/EA)

## (undated) DEVICE — SYRINGE SAFETY 10 ML 18 GA X 1 1/2 BLUNT LL (100/BX 4BX/CA)

## (undated) DEVICE — Device

## (undated) DEVICE — SURGIFOAM (SIZE 100) - (6EA/CA)

## (undated) DEVICE — BLADE CLIPPER FITS 2501 CLIPPER (BLUE)  (20EA/CA)

## (undated) DEVICE — COVER LIGHT HANDLE ALC PLUS DISP (18EA/BX)

## (undated) DEVICE — TOWELS CLOTH SURGICAL - (4/PK 20PK/CA)

## (undated) DEVICE — SYRINGE NON SAFETY 5 CC 20 GA X 1-1/2 IN (100/BX 4BX/CA)

## (undated) DEVICE — TUBING C&T SET FLYING LEADS DRAIN TUBING (10EA/BX)

## (undated) DEVICE — GLOVE BIOGEL PI INDICATOR SZ 7.5 SURGICAL PF LF -(50/BX 4BX/CA)

## (undated) DEVICE — KIT EVACUATER 3 SPRING PVC LF 1/8 DRAIN SIZE (10EA/CA)"

## (undated) DEVICE — HEMOSTAT SURG ABSORBABLE - 4 X 8 IN SURGICEL (24EA/CA)

## (undated) DEVICE — SUTURE GENERAL

## (undated) DEVICE — RUBBERBAND STERILE #64 LATEX FREE (100/CA)

## (undated) DEVICE — SUCTION INSTRUMENT YANKAUER BULBOUS TIP W/O VENT (50EA/CA)

## (undated) DEVICE — TOOL MR8 2.3CM F2/7CM TAPER (1/EA)

## (undated) DEVICE — SUTURE 0 VICRYL PLUS CT-1 - 8 X 18 INCH (12/BX)

## (undated) DEVICE — SUTURE 4-0 NUROLON CR/8 TF - (12/BX) ETHICON

## (undated) DEVICE — LACTATED RINGERS INJ 1000 ML - (14EA/CA 60CA/PF)

## (undated) DEVICE — SUTURE 3-0 VICRYL PLUS RB-1 - 8 X 18 INCH (12/BX)

## (undated) DEVICE — SODIUM CHL IRRIGATION 0.9% 1000ML (12EA/CA)

## (undated) DEVICE — ELECTRODE DUAL RETURN W/ CORD - (50/PK)

## (undated) DEVICE — GLOVE BIOGEL INDICATOR SZ 8 SURGICAL PF LTX - (50/BX 4BX/CA)

## (undated) DEVICE — SUTURE ETHILON 2-0 FSLX 30 (36PK/BX)"

## (undated) DEVICE — DRESSING NON-ADHERING 8 X 3 - (50/BX)

## (undated) DEVICE — TOOL MR8 9CM ACORN 7.5MM DIAMETER (1/EA)

## (undated) DEVICE — MIDAS LUBRICATOR DIFFUSER PACK (4EA/CA)

## (undated) DEVICE — SCALP CLIP RANEY 20-1037 (10EA/PK 20PK/CA)

## (undated) DEVICE — BLADE ELECTRODE COATED EDGE (50EA/PK)

## (undated) DEVICE — CANISTER SUCTION 3000ML MECHANICAL FILTER AUTO SHUTOFF MEDI-VAC NONSTERILE LF DISP  (40EA/CA)

## (undated) DEVICE — GLOVE SZ 7.5 BIOGEL PI MICRO - PF LF (50PR/BX)

## (undated) DEVICE — STAPLER SKIN DISP - (6/BX 10BX/CA) VISISTAT

## (undated) DEVICE — FORCEPS IRRIGATING 9 X 1.5MM (5EA/BX)

## (undated) DEVICE — CONTAINER SPECIMEN BAG OR - STERILE 4 OZ W/LID (100EA/CA)

## (undated) DEVICE — TUBING CLEARLINK DUO-VENT - C-FLO (48EA/CA)

## (undated) DEVICE — GLOVE BIOGEL SZ 8 SURGICAL PF LTX - (50PR/BX 4BX/CA)

## (undated) DEVICE — SET EXTENSION WITH 2 PORTS (48EA/CA) ***PART #2C8610 IS A SUBSTITUTE*****

## (undated) DEVICE — PACK CRANI - (1EA/CA)